# Patient Record
Sex: MALE | Race: WHITE | Employment: FULL TIME | URBAN - METROPOLITAN AREA
[De-identification: names, ages, dates, MRNs, and addresses within clinical notes are randomized per-mention and may not be internally consistent; named-entity substitution may affect disease eponyms.]

---

## 2017-02-03 ENCOUNTER — OFFICE VISIT (OUTPATIENT)
Dept: NEUROLOGY | Age: 42
End: 2017-02-03

## 2017-02-03 VITALS
WEIGHT: 308 LBS | HEART RATE: 79 BPM | BODY MASS INDEX: 44.09 KG/M2 | HEIGHT: 70 IN | SYSTOLIC BLOOD PRESSURE: 186 MMHG | DIASTOLIC BLOOD PRESSURE: 98 MMHG

## 2017-02-03 DIAGNOSIS — G40.209 PARTIAL EPILEPSY WITH IMPAIRMENT OF CONSCIOUSNESS (HCC): Primary | ICD-10-CM

## 2017-02-03 PROCEDURE — 99213 OFFICE O/P EST LOW 20 MIN: CPT | Performed by: PSYCHIATRY & NEUROLOGY

## 2017-02-03 RX ORDER — LEVETIRACETAM 750 MG/1
750 TABLET ORAL 2 TIMES DAILY
Qty: 180 TABLET | Refills: 1 | Status: SHIPPED | OUTPATIENT
Start: 2017-02-03 | End: 2017-08-03 | Stop reason: SDUPTHER

## 2017-08-03 ENCOUNTER — OFFICE VISIT (OUTPATIENT)
Dept: NEUROLOGY | Age: 42
End: 2017-08-03

## 2017-08-03 VITALS
DIASTOLIC BLOOD PRESSURE: 96 MMHG | HEART RATE: 63 BPM | HEIGHT: 70 IN | SYSTOLIC BLOOD PRESSURE: 155 MMHG | WEIGHT: 308 LBS | BODY MASS INDEX: 44.09 KG/M2

## 2017-08-03 DIAGNOSIS — G40.209 PARTIAL EPILEPSY WITH IMPAIRMENT OF CONSCIOUSNESS (HCC): ICD-10-CM

## 2017-08-03 PROCEDURE — 99213 OFFICE O/P EST LOW 20 MIN: CPT | Performed by: PSYCHIATRY & NEUROLOGY

## 2017-08-03 RX ORDER — LEVETIRACETAM 750 MG/1
750 TABLET ORAL 2 TIMES DAILY
Qty: 180 TABLET | Refills: 1 | Status: SHIPPED | OUTPATIENT
Start: 2017-08-03 | End: 2018-02-05 | Stop reason: SDUPTHER

## 2018-02-05 ENCOUNTER — OFFICE VISIT (OUTPATIENT)
Dept: NEUROLOGY | Age: 43
End: 2018-02-05

## 2018-02-05 VITALS
SYSTOLIC BLOOD PRESSURE: 152 MMHG | HEIGHT: 70 IN | BODY MASS INDEX: 45.1 KG/M2 | HEART RATE: 77 BPM | DIASTOLIC BLOOD PRESSURE: 88 MMHG | WEIGHT: 315 LBS

## 2018-02-05 DIAGNOSIS — G40.209 PARTIAL EPILEPSY WITH IMPAIRMENT OF CONSCIOUSNESS (HCC): ICD-10-CM

## 2018-02-05 PROCEDURE — 99213 OFFICE O/P EST LOW 20 MIN: CPT | Performed by: PSYCHIATRY & NEUROLOGY

## 2018-02-05 RX ORDER — LEVETIRACETAM 750 MG/1
750 TABLET ORAL 2 TIMES DAILY
Qty: 180 TABLET | Refills: 1 | Status: SHIPPED | OUTPATIENT
Start: 2018-02-05 | End: 2018-09-17 | Stop reason: SDUPTHER

## 2018-02-05 NOTE — PROGRESS NOTES
normal. No carotid bruit. No neck stiffness. Impression :  Partial complex seizures well controlled. Plan :  Continue Keppra 750 mg b.i.d. Please note a portion of  this chart was generated using dragon dictation software. Although every effort was made to ensure the accuracy of this automated transcription, some errors in transcription may have occurred.

## 2018-02-05 NOTE — PATIENT INSTRUCTIONS
Please call with any questions or concerns:   [facility]  @ [phone]. LAB RESULTS:  Please obtain any labs or diagnostic tests as discussed today. You should call the office to check the results. Please allow  3 to 7 days for us to get these results. MEDICATION LIST:  Please bring an accurate list of your medications to every visit. APPOINTMENT CONFIRMATION:  We will call you the day before your scheduled appointment to confirm. If we are unable to reach you, you MUST call back by the end of the day to confirm the appointment or we may be forced to cancel.

## 2018-09-17 ENCOUNTER — OFFICE VISIT (OUTPATIENT)
Dept: NEUROLOGY | Age: 43
End: 2018-09-17

## 2018-09-17 VITALS
WEIGHT: 315 LBS | HEART RATE: 76 BPM | BODY MASS INDEX: 45.1 KG/M2 | HEIGHT: 70 IN | DIASTOLIC BLOOD PRESSURE: 94 MMHG | SYSTOLIC BLOOD PRESSURE: 162 MMHG

## 2018-09-17 DIAGNOSIS — G40.209 PARTIAL EPILEPSY WITH IMPAIRMENT OF CONSCIOUSNESS (HCC): ICD-10-CM

## 2018-09-17 PROCEDURE — 99213 OFFICE O/P EST LOW 20 MIN: CPT | Performed by: PSYCHIATRY & NEUROLOGY

## 2018-09-17 RX ORDER — LEVETIRACETAM 750 MG/1
750 TABLET ORAL 2 TIMES DAILY
Qty: 180 TABLET | Refills: 1 | Status: SHIPPED | OUTPATIENT
Start: 2018-09-17 | End: 2019-02-22 | Stop reason: SDUPTHER

## 2019-02-22 ENCOUNTER — OFFICE VISIT (OUTPATIENT)
Dept: NEUROLOGY | Age: 44
End: 2019-02-22
Payer: COMMERCIAL

## 2019-02-22 VITALS
HEART RATE: 60 BPM | BODY MASS INDEX: 43.91 KG/M2 | DIASTOLIC BLOOD PRESSURE: 86 MMHG | SYSTOLIC BLOOD PRESSURE: 142 MMHG | WEIGHT: 306 LBS

## 2019-02-22 DIAGNOSIS — G40.209 PARTIAL EPILEPSY WITH IMPAIRMENT OF CONSCIOUSNESS (HCC): ICD-10-CM

## 2019-02-22 PROCEDURE — G8484 FLU IMMUNIZE NO ADMIN: HCPCS | Performed by: PSYCHIATRY & NEUROLOGY

## 2019-02-22 PROCEDURE — G8417 CALC BMI ABV UP PARAM F/U: HCPCS | Performed by: PSYCHIATRY & NEUROLOGY

## 2019-02-22 PROCEDURE — 99213 OFFICE O/P EST LOW 20 MIN: CPT | Performed by: PSYCHIATRY & NEUROLOGY

## 2019-02-22 PROCEDURE — G8427 DOCREV CUR MEDS BY ELIG CLIN: HCPCS | Performed by: PSYCHIATRY & NEUROLOGY

## 2019-02-22 PROCEDURE — 1036F TOBACCO NON-USER: CPT | Performed by: PSYCHIATRY & NEUROLOGY

## 2019-02-22 RX ORDER — LEVETIRACETAM 750 MG/1
750 TABLET ORAL 2 TIMES DAILY
Qty: 180 TABLET | Refills: 1 | Status: SHIPPED | OUTPATIENT
Start: 2019-02-22 | End: 2019-07-29 | Stop reason: SDUPTHER

## 2019-07-29 ENCOUNTER — OFFICE VISIT (OUTPATIENT)
Dept: NEUROLOGY | Age: 44
End: 2019-07-29
Payer: COMMERCIAL

## 2019-07-29 VITALS
DIASTOLIC BLOOD PRESSURE: 80 MMHG | HEIGHT: 70 IN | SYSTOLIC BLOOD PRESSURE: 146 MMHG | BODY MASS INDEX: 40.66 KG/M2 | WEIGHT: 284 LBS | HEART RATE: 74 BPM

## 2019-07-29 DIAGNOSIS — G40.209 PARTIAL EPILEPSY WITH IMPAIRMENT OF CONSCIOUSNESS (HCC): Primary | ICD-10-CM

## 2019-07-29 PROCEDURE — G8417 CALC BMI ABV UP PARAM F/U: HCPCS | Performed by: PSYCHIATRY & NEUROLOGY

## 2019-07-29 PROCEDURE — G8427 DOCREV CUR MEDS BY ELIG CLIN: HCPCS | Performed by: PSYCHIATRY & NEUROLOGY

## 2019-07-29 PROCEDURE — 1036F TOBACCO NON-USER: CPT | Performed by: PSYCHIATRY & NEUROLOGY

## 2019-07-29 PROCEDURE — 99213 OFFICE O/P EST LOW 20 MIN: CPT | Performed by: PSYCHIATRY & NEUROLOGY

## 2019-07-29 RX ORDER — LEVETIRACETAM 750 MG/1
750 TABLET ORAL 2 TIMES DAILY
Qty: 180 TABLET | Refills: 1 | Status: SHIPPED | OUTPATIENT
Start: 2019-07-29 | End: 2020-01-17 | Stop reason: SDUPTHER

## 2019-07-29 NOTE — PATIENT INSTRUCTIONS
Please call with any questions or concerns:   [facility]  @ [phone]. LAB RESULTS:  Please obtain any labs or diagnostic tests as discussed today. You should call the office to check the results. Please allow  3 to 7 days for us to get these results. MEDICATION LIST:  Please bring an accurate list of your medications to every visit. APPOINTMENT CONFIRMATION:  We will call you the day before your scheduled appointment to confirm. If we are unable to reach you, you MUST call back by the end of the day to confirm the appointment or we may be forced to cancel.

## 2020-01-17 ENCOUNTER — OFFICE VISIT (OUTPATIENT)
Dept: NEUROLOGY | Age: 45
End: 2020-01-17
Payer: COMMERCIAL

## 2020-01-17 VITALS
HEIGHT: 70 IN | SYSTOLIC BLOOD PRESSURE: 172 MMHG | BODY MASS INDEX: 40.97 KG/M2 | WEIGHT: 286.2 LBS | HEART RATE: 62 BPM | DIASTOLIC BLOOD PRESSURE: 90 MMHG

## 2020-01-17 PROCEDURE — 99213 OFFICE O/P EST LOW 20 MIN: CPT | Performed by: PSYCHIATRY & NEUROLOGY

## 2020-01-17 RX ORDER — LEVETIRACETAM 750 MG/1
750 TABLET ORAL 2 TIMES DAILY
Qty: 180 TABLET | Refills: 1 | Status: SHIPPED | OUTPATIENT
Start: 2020-01-17 | End: 2020-08-03 | Stop reason: SDUPTHER

## 2020-01-17 NOTE — PATIENT INSTRUCTIONS
Please call with any questions or concerns:   [facility]  @ [phone]. LAB RESULTS:  Please obtain any labs or diagnostic tests as discussed today. You should call the office to check the results. Please allow  3 to 7 days for us to get these results. MEDICATION LIST:  Please bring an accurate list of your medications to every visit. APPOINTMENT CONFIRMATION:  We will call you the day before your scheduled appointment to confirm. If we are unable to reach you, you MUST call back by the end of the day to confirm the appointment or we may be forced to cancel.

## 2020-01-17 NOTE — PROGRESS NOTES
Active member of club or organization: None     Attends meetings of clubs or organizations: None     Relationship status: None    Intimate partner violence:     Fear of current or ex partner: None     Emotionally abused: None     Physically abused: None     Forced sexual activity: None   Other Topics Concern    None   Social History Narrative    None        Objective:  Exam:  BP (!) 172/90   Pulse 62   Ht 5' 10\" (1.778 m)   Wt 286 lb 3.2 oz (129.8 kg)   BMI 41.07 kg/m²   This is an obese patient in no acute distress  Patient is awake, alert and oriented x3. Speech is normal.  Pupils are equal round reacting to light. Extraocular movements intact. Face symmetrical. Tongue midline. Motor exam shows normal symmetrical strength. Deep tendon reflexes normal. Plantar reflexes downgoing. Sensory exam normal. Coordination normal. Gait normal. No carotid bruit. No neck stiffness. Impression :  Partial complex seizures stable    Plan :  Continue Keppra 750 mg twice daily  Recommended that he continue to exercise and continue to lose weight. Please note a portion of  this chart was generated using dragon dictation software. Although every effort was made to ensure the accuracy of this automated transcription, some errors in transcription may have occurred.

## 2020-06-23 ENCOUNTER — OFFICE VISIT (OUTPATIENT)
Dept: DERMATOLOGY | Age: 45
End: 2020-06-23
Payer: COMMERCIAL

## 2020-06-23 VITALS — TEMPERATURE: 96.4 F

## 2020-06-23 PROCEDURE — 11102 TANGNTL BX SKIN SINGLE LES: CPT | Performed by: DERMATOLOGY

## 2020-06-23 PROCEDURE — 99202 OFFICE O/P NEW SF 15 MIN: CPT | Performed by: DERMATOLOGY

## 2020-06-25 LAB — DERMATOLOGY PATHOLOGY REPORT: ABNORMAL

## 2020-07-09 ENCOUNTER — PROCEDURE VISIT (OUTPATIENT)
Dept: SURGERY | Age: 45
End: 2020-07-09
Payer: COMMERCIAL

## 2020-07-09 VITALS — TEMPERATURE: 97.1 F

## 2020-07-09 PROCEDURE — 17311 MOHS 1 STAGE H/N/HF/G: CPT | Performed by: DERMATOLOGY

## 2020-07-09 PROCEDURE — 12052 INTMD RPR FACE/MM 2.6-5.0 CM: CPT | Performed by: DERMATOLOGY

## 2020-07-09 NOTE — PROGRESS NOTES
PRE-PROCEDURE SCREENING    Pacemaker/ICD: No  Difficulty with numbing in the past: No  Local Anesthesia Reaction/passing out: No  Latex or adhesive allergy:  No  Bleeding/Clotting Disorders: No  Anticoagulant Therapy: No  Joint prosthesis: No  Artificial Heart Valve: No  Stroke or Seizures: Yes, seizure (not had one in over 25 years)  Organ Transplant or Lymphoma: No  Immunosuppression: No  Respiratory Problems: No

## 2020-07-09 NOTE — PATIENT INSTRUCTIONS
[facility] Office Hours:    Monday-Thursday  7:30 AM-4:30 PM    Friday  9:00 AM-1:00 PM    POST-OPERATIVE CARE FOR DISSOLVING STICHES  Bandage change after 48 hours    During your procedure today, dissolving sutures, or stiches, were used to close the wound. You do not have to have stiches removed. They will dissolve (melt away) on their own. Please follow these instructions to help you recover from your procedure and help your wound heal.    CARING FOR YOUR SURGICAL SITE  The bandage should remain on and completely dry for 48 hours. Do NOT get the bandage wet. 1. After the first 48 hours, gently remove the remaining part of the bandage. It can be helpful to moisten the bandage edges in the shower. Steri strips may still be on the wound. It is ok, they will fall off slowly with the daily bandage changes. 2. Gently clean AROUND the wound daily with mild soap and water. Please avoid the area from getting wet. The more moisture is on the sutures the quicker they will dissolve. 3. Dry (pat) the area with a clean Q-tip or gauze. Try to clean off any debris or crust from the area. 4. Apply a layer of Vaseline/ Aquaphor (or Bacitracin if your doctor recommends) to the wound area only. 5. Cut a piece of Telfa (or any non-stick dressing) to fit just over the wound and secure it with paper tape. If the wound is small you may use a Band- Aid. Keep area covered for a total of 1 week(s). If the dressing comes off or if you have questions, or concerns about the dressing, please call the office for instructions! POST OPERATIVE INSTRUCTIONS    1. Activity: Do not lift anything heavier than a gallon of milk for 1 week. Also, avoid strenuous activity such as running, power walking or contact sports. 2. Eating and drinking: Do not drink alcohol for 48 hours after your procedure. Alcohol increases the chances of bleeding.   3. Medicines   -If you have discomfort, take Acetaminophen (Tylenol or Extra Strength Tylenol). Follow the instructions and warning on the bottle. -If your doctor has prescribed you an Aspirin daily, please keep taking it. Do not take extra Aspirin or medicines containing Aspirin (such as Sidra-[place] and Excedrin) for 48 hours after your procedure. Bleeding: If bleeding occurs, DO NOT remove the bandage. Put firm pressure on the area with gauze for 20 minutes without peeking. If the bleeding continues, apply pressure for another 20 minutes. If the bleeding does not stop after you apply pressure, call us right away. If you can not call, go to the nearest emergency room or urgent care facility. What to expect:  You may have these symptoms. They are normal and should get better with time:  1. Swelling. Swelling usually increases for the first 48 hours after your procedure and then begins to improve. Some soreness and redness around your wound. If we worked close to your eyes (forehead, nose, temple, or upper cheeks) your eyes may become swollen and/ or black and blue. 2. Bruising, which could last 1 week or more. 3. Pink and bumpy appearance to the scar. This may happen a few weeks after your procedure. After 4 weeks, you may gently massage the area each day with facial moisturizer or petroleum jelly (Vaseline or Aquaphor). This will help to smooth the skin and improve the appearance of the scar. The color of your scar will fade over time, but it may be pink for several months after the procedure. The scar may take 6 months to 1 year to reach its final color and appearance. 4. \"Spitting\" suture. Occasionally, an inside suture (stitch) does not completely dissolve. When this happens, (generally 4-8 weeks after surgery), it causes a bump or \"pimple\" to form on the scar. This is easily removed and is not at all serious. It does not mean the skin cancer has returned. Contact us if it happens, but do not be alarmed. Vitamin E oil is NOT necessary.  A good moisturizer is just as a gallon of milk for 1 week. Also, avoid strenuous activity such as running, power walking or contact sports. 5. Eating and drinking: Do not drink alcohol for 48 hours after your procedure. Alcohol increases the chances of bleeding. 6. Medicines   -If you have discomfort, take Acetaminophen (Tylenol or Extra Strength Tylenol). Follow the instructions and warning on the bottle. -If your doctor has prescribed you an Aspirin daily, please keep taking it. Do not take extra Aspirin or medicines containing Aspirin (such as Sidra-Prue and Excedrin) for 48 hours after your procedure. Bleeding: If bleeding occurs, DO NOT remove the bandage. Put firm pressure on the area with gauze for 20 minutes without peeking. If the bleeding continues, apply pressure for another 20 minutes. If the bleeding does not stop after you apply pressure, call us right away. If you can not call, go to the nearest emergency room or urgent care facility. What to expect:  You may have these symptoms. They are normal and should get better with time:  5. Swelling. Swelling usually increases for the first 48 hours after your procedure and then begins to improve. Some soreness and redness around your wound. If we worked close to your eyes (forehead, nose, temple, or upper cheeks) your eyes may become swollen and/ or black and blue. 6. Bruising, which could last 1 week or more. 7. Pink and bumpy appearance to the scar. This may happen a few weeks after your procedure. After 4 weeks, you may gently massage the area each day with facial moisturizer or petroleum jelly (Vaseline or Aquaphor). This will help to smooth the skin and improve the appearance of the scar. The color of your scar will fade over time, but it may be pink for several months after the procedure. The scar may take 6 months to 1 year to reach its final color and appearance. 8. \"Spitting\" suture. Occasionally, an inside suture (stitch) does not completely dissolve.  When this happens, (generally 4-8 weeks after surgery), it causes a bump or \"pimple\" to form on the scar. This is easily removed and is not at all serious. It does not mean the skin cancer has returned. Contact us if it happens, but do not be alarmed. Vitamin E oil is NOT necessary. A good moisturizer is just as effective. Sunscreen IS necessary. Use at least and SPF 30 sunscreen daily- even in winter    Call us at [phone] right away if you have any of the following symptoms:  -Bleeding that you can not stop (see highlighted area above). -Pain that lasts longer than 48 hours.  -Your wound becomes more painful, red or hot. -Bruising and swelling that does not begin to improve within the 48 hours or gets worse suddenly.

## 2020-07-09 NOTE — PROGRESS NOTES
The patient was counseled at length about the risks of danelle Covid-19 during their perioperative period and any recovery window from their procedure. The patient was made aware that danelle Covid-19  may worsen their prognosis for recovering from their procedure  and lend to a higher morbidity and/or mortality risk. All material risks, benefits, and reasonable alternatives including postponing the procedure were discussed. The patient does wish to proceed with the procedure at this time. MOHS PROCEDURE NOTE    PHYSICIAN:  Quirino Kasper. Helen Jimenez MD    ASSISTANT: Desmond Barrera RN     REFERRING PROVIDER:   Rodríguez Barry MD    PREOPERATIVE DIAGNOSIS: Nodular Basal Cell Carcinoma     SPECIFIC MOHS INDICATIONS:  location    AUC SCORIN/9    POSTOPERATIVE DIAGNOSIS: SAME    LOCATION: Left cheek    OPERATIVE PROCEDURE:  MOHS MICROGRAPHIC SURGERY    RECONSTRUCTION OF DEFECT: Intermediate layered closure    PREOPERATIVE SIZE: 8x7 MM    DEFECT SIZE: 14x11 MM    LENGTH OF REPAIRED WOUND/SIZE OF FLAP/SIZE OF GRAFT:  35 MM    ANESTHESIA: 6 mL 1% lidocaine with epinephrine 1:100,000 buffered. EBL:  MINIMAL    DURATION OF PROCEDURE:  1.25 HOURS    POSTOPERATIVE OBSERVATION: 0.75 HOUR    SPECIMENS:  SEE MOHS MAP    COMPLICATIONS:  NONE    DESCRIPTION OF PROCEDURE:  The patient was given a mirror, as appropriate, and the biopsy site was identified, marked with a surgical marking pen, and verified by the patient. Options for treatment were discussed and the patient was informed that Mohs surgery was the selected treatment based on its lower recurrence rate, given the features listed above, as compared to other treatment modalities such as excision, radiation, or curettage, and agreed with this treatment plan.   Risks and benefits including bruising, swelling, bleeding, infection, nerve injury, recurrence, and scarring were discussed with the patient prior to the procedure and a written consent detailing these and other risks was reviewed with the patient and signed. There was a time out for person and procedure verification. The surgical site was prepped with an antiseptic solution. Application of an antiseptic solution was repeated before each surgical stage. Stage I:  The clinically-apparent tumor was carefully defined and debulked, determining the edge of the surgical excision. A thin layer of tumor-laden tissue was excised with a narrow margin of normal-appearing skin, using the technique of Mohs. A map was prepared to correspond to the area of skin from which it was excised. Hemostasis was achieved using electrosurgery. The wound was bandaged. The tissue was prepared for the cryostat and sectioned. 1 section(s) prepared. Each section was coded, cut, and stained for microscopic examination. The entire base and margins of the excised piece of tissue were examined by the surgeon. No tumor was identified at the peripheral margins of stage I of microscopically controlled surgery. DEFECT MANAGEMENT:    REPAIR DESCRIPTION:  Various closure modalities were discussed with the patient, and it was decided that an intermediate layered repair would best preserve normal anatomic and functional relationships. Additional risk of wound dehiscence was discussed. The area was anesthetized with 1% lidocaine with epinephrine 1:100,000 buffered, was given a sterile prep using Chlorhexidine gluconate 4% solution and draped in the usual sterile fashion. Recreation and enlargement of the wound was performed by excising cones of tissue via the triangulation technique. The final incision lines were placed with respect for the patient's natural skin tension lines in a linear configuration to avoid functional and aesthetic distortion of adjacent free margins. Following minimal undermining, meticulous hemostasis was obtained with spot monopolar electrocoagulation.   Subcutaneous dead space and dermis were closed using 5-0 Vicryl buried subcutaneous interrupted suture and the epidermis was approximated with 5-0 Fast absorbing gut running epidermal sutures. WOUND COVERAGE:  The wound was cleaned with normal saline solution, dried off, Aquaphor ointment was applied, and the wound was covered. A dressing was applied for stabilization and light pressure. The patient was given detailed oral and written instructions on postoperative care. There were no complications. The patient left the Unit in good medical condition. FOLLOW-UP:  As dissolving sutures were placed, the patient was asked to return if any questions or concerns arose, but otherwise will return to see general dermatology per their instructions.

## 2020-07-10 ENCOUNTER — TELEPHONE (OUTPATIENT)
Dept: SURGERY | Age: 45
End: 2020-07-10

## 2020-07-10 NOTE — TELEPHONE ENCOUNTER
The patient was in the office on 07/09/20 for Mohs located on the left cheek with ILC repair. The patient tolerated the procedure well and left the office in good condition. A post-operative telephone call was placed at [phone] in order to check on the patient's recovery process. The patient was not able to be reached and a phone message was left.

## 2020-08-03 ENCOUNTER — OFFICE VISIT (OUTPATIENT)
Dept: NEUROLOGY | Age: 45
End: 2020-08-03
Payer: COMMERCIAL

## 2020-08-03 VITALS
BODY MASS INDEX: 41.52 KG/M2 | HEART RATE: 77 BPM | HEIGHT: 70 IN | WEIGHT: 290 LBS | DIASTOLIC BLOOD PRESSURE: 96 MMHG | SYSTOLIC BLOOD PRESSURE: 185 MMHG

## 2020-08-03 PROCEDURE — 99213 OFFICE O/P EST LOW 20 MIN: CPT | Performed by: PSYCHIATRY & NEUROLOGY

## 2020-08-03 RX ORDER — LEVETIRACETAM 750 MG/1
750 TABLET ORAL 2 TIMES DAILY
Qty: 180 TABLET | Refills: 1 | Status: SHIPPED | OUTPATIENT
Start: 2020-08-03 | End: 2021-02-19 | Stop reason: SDUPTHER

## 2020-08-03 NOTE — PROGRESS NOTES
[facility]   Neurology followup    Subjective:   CC/HP  History was obtained from patient  Patient has known partial complex seizures and these are well controlled. Patient's last seizure was in 1992. Patient is on Keppra without any major side effects.   No new neurological symptoms        REVIEW OF SYSTEMS    Constitutional:  []   Chills   []  Fatigue   []  Fevers   []  Malaise   []  Weight loss     [x] Denies all of the above    Respiratory:   []  Cough    []  Shortness of breath         [x] Denies all of the above     Cardiovascular:   []  Chest pain    []  Exertional chest pressure/discomfort           [] Palpitations    []  Syncope     [x] Denies all of the above        Past Medical History:   Diagnosis Date    Basal cell carcinoma     Localization-related (focal) (partial) epilepsy and epileptic syndromes with complex partial seizures, without mention of intractable epilepsy 1988    last seizure was in 13     Family History   Problem Relation Age of Onset    Cancer Mother     High Blood Pressure Mother     Diabetes Father     High Blood Pressure Father     Cancer Father         melanoma     Social History     Socioeconomic History    Marital status:      Spouse name: None    Number of children: None    Years of education: None    Highest education level: None   Occupational History    None   Social Needs    Financial resource strain: None    Food insecurity     Worry: None     Inability: None    Transportation needs     Medical: None     Non-medical: None   Tobacco Use    Smoking status: Never Smoker    Smokeless tobacco: Never Used   Substance and Sexual Activity    Alcohol use: No    Drug use: No    Sexual activity: None   Lifestyle    Physical activity     Days per week: None     Minutes per session: None    Stress: None   Relationships    Social connections     Talks on phone: None     Gets together: None     Attends Judaism service: None     Active member of club

## 2020-12-07 ENCOUNTER — OFFICE VISIT (OUTPATIENT)
Dept: DERMATOLOGY | Age: 45
End: 2020-12-07
Payer: COMMERCIAL

## 2020-12-07 VITALS — TEMPERATURE: 98.1 F

## 2020-12-07 PROCEDURE — 99213 OFFICE O/P EST LOW 20 MIN: CPT | Performed by: DERMATOLOGY

## 2020-12-07 RX ORDER — TRIAMCINOLONE ACETONIDE 1 MG/G
CREAM TOPICAL
Qty: 45 G | Refills: 2 | Status: SHIPPED | OUTPATIENT
Start: 2020-12-07 | End: 2022-06-27 | Stop reason: SDUPTHER

## 2020-12-07 NOTE — PATIENT INSTRUCTIONS
DRY SKIN CARE    1. Do not take more than 1 shower or bath each day. Try to spend 10 minutes or less in the shower/bath. Avoid hot showers as this can damage the skin and make the dryness worse. 2. Use a moisturizing or mild soap such Dove (for sensitive skin), Cetaphil (Cleansing Bar,Gentle Body Wash, Restoraderm Soothing Wash), CeraVe Hydrating Body Wash, Eucerin Skin Calming Body Wash, or Aveeno Daily Moisturizing Body Wash. Antibacterial soaps can be too drying. 3. Use soap only on limited areas (face, underarms, groin and buttocks). Try to avoid using soap on the arms, legs, trunk and back. 4. After showering, pat dry with a towel and generously apply a thick moisturizer all over. Use a moisturizer every day even if you are not showering that particular day. 5. If you are able, apply the moisturizer a second time during the day as well. 6. If a prescription cream or ointment has been ordered for you, apply the prescription medication to affected areas after your bath/shower while the skin is still damp, then apply the moisturizer to the remainder of the skin. 7. When it comes to moisturizers, the thicker the better. Ointments (such as vaseline) are thicker than creams, and creams are thicker than lotions. Suggested over-the-counter moisturizers include:    · CeraVe Lotion or Cream  · Cetaphil Lotion or Cream  · Eucerin Advanced Repair Cream, Advanced Repair Lotion, Eczema Relief Cream or Intensive Repair Lotion.    · Lubriderm Lotion  · Vaseline (petroleum jelly)  · Aquaphor  · Julianne moisturizing lotion or cream  · Neutrogena Hand Cream  · Aveeno Moisturizing Cream or Lotion  · Curel Ultra Healing   · Vanicream Moisturizing Skin Cream  ·

## 2020-12-07 NOTE — PROGRESS NOTES
[facility]  Roseanne Moreira MD  [phone]      Bina Clemente  1975    41 y.o. male     Date of Visit: 12/7/2020    Chief Complaint: follow up for [facility]    History of Present Illness:    1. He complains of a recent onset pruritic eruption on the left leg. 2.  He complains of a persistent asymptomatic lesion on the left lower forehead. 3.  He has a history of a nodular basal cell carcinoma on the left cheek-treated with Mohs by Dr. Jose Bo on 7/9/2020. Denies any signs of recurrence. Has fair skin. Has had multiple sunburns on the scalp. Dad w/ hx of melanoma on scalp.       Review of Systems:  Skin: No new or changing moles. Past Medical History, Family History, Surgical History, Medications and Allergies reviewed. Past Medical History:   Diagnosis Date    Basal cell carcinoma     Localization-related (focal) (partial) epilepsy and epileptic syndromes with complex partial seizures, without mention of intractable epilepsy 1988    last seizure was in 1992     Past Surgical History:   Procedure Laterality Date    MOHS SURGERY  07/09/2020       No Known Allergies  Outpatient Medications Marked as Taking for the 12/7/20 encounter (Office Visit) with Jhonathan Garcia MD   Medication Sig Dispense Refill    triamcinolone (KENALOG) 0.1 % cream Apply to affected areas of the skin twice daily for up to 2 weeks or until improved. 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        Physical Examination       The following were examined and determined to be normal: Psych/Neuro, Scalp/hair, Head/face, Conjunctivae/eyelids, Gums/teeth/lips, Neck, Breast/axilla/chest, Abdomen, Back, RUE, LUE, RLE and Nails/digits. The following were examined and determined to be abnormal: LLE. Well appearing.     1.  Left lower shin - scaly and crusted erythematous patch. 2.  Left lower forehead - stuck on appearing verrucous light brown plaque. 3.  Left superolateral cheek - linear surgical scar. Assessment and Plan     1. Irritant dermatitis -     Triamcinolone 0.1% cream twice daily for up to 2 weeks or until improved. 2. SK (seborrheic keratosis)     Reassurance. 3. History of basal cell carcinoma (BCC) - clear    Sun protective behaviors and self skin examinations were encouraged. Call for any new or concerning lesions. Return in about 6 months (around 6/7/2021).     --Andressa Carodna MD

## 2021-02-19 ENCOUNTER — OFFICE VISIT (OUTPATIENT)
Dept: NEUROLOGY | Age: 46
End: 2021-02-19
Payer: COMMERCIAL

## 2021-02-19 VITALS
SYSTOLIC BLOOD PRESSURE: 185 MMHG | BODY MASS INDEX: 43.81 KG/M2 | TEMPERATURE: 98 F | DIASTOLIC BLOOD PRESSURE: 102 MMHG | WEIGHT: 306 LBS | HEIGHT: 70 IN | HEART RATE: 80 BPM

## 2021-02-19 DIAGNOSIS — G40.209 PARTIAL EPILEPSY WITH IMPAIRMENT OF CONSCIOUSNESS (HCC): Primary | ICD-10-CM

## 2021-02-19 PROCEDURE — 99213 OFFICE O/P EST LOW 20 MIN: CPT | Performed by: PSYCHIATRY & NEUROLOGY

## 2021-02-19 RX ORDER — LEVETIRACETAM 750 MG/1
750 TABLET ORAL 2 TIMES DAILY
Qty: 180 TABLET | Refills: 1 | Status: SHIPPED | OUTPATIENT
Start: 2021-02-19 | End: 2021-08-02 | Stop reason: SDUPTHER

## 2021-02-19 NOTE — PROGRESS NOTES
[facility]   Neurology followup    Subjective:   CC/HP  History was obtained from patient  Patient has known partial complex seizures and these are well controlled. Patient's last seizure was in 1992. Patient is on Keppra without any major side effects.   No new neurological symptoms        REVIEW OF SYSTEMS    Constitutional:  []   Chills   []  Fatigue   []  Fevers   []  Malaise   []  Weight loss     [x] Denies all of the above    Respiratory:   []  Cough    []  Shortness of breath         [x] Denies all of the above     Cardiovascular:   []  Chest pain    []  Exertional chest pressure/discomfort           [] Palpitations    []  Syncope     [x] Denies all of the above        Past Medical History:   Diagnosis Date    Basal cell carcinoma     Localization-related (focal) (partial) epilepsy and epileptic syndromes with complex partial seizures, without mention of intractable epilepsy 1988    last seizure was in 13     Family History   Problem Relation Age of Onset    Cancer Mother     High Blood Pressure Mother     Diabetes Father     High Blood Pressure Father     Cancer Father         melanoma     Social History     Socioeconomic History    Marital status:      Spouse name: Not on file    Number of children: Not on file    Years of education: Not on file    Highest education level: Not on file   Occupational History    Not on file   Social Needs    Financial resource strain: Not on file    Food insecurity     Worry: Not on file     Inability: Not on file    Transportation needs     Medical: Not on file     Non-medical: Not on file   Tobacco Use    Smoking status: Never Smoker    Smokeless tobacco: Never Used   Substance and Sexual Activity    Alcohol use: No    Drug use: No    Sexual activity: Not on file   Lifestyle    Physical activity     Days per week: Not on file     Minutes per session: Not on file    Stress: Not on file   Relationships    Social connections     Talks on phone: Not on file     Gets together: Not on file     Attends Sabianist service: Not on file     Active member of club or organization: Not on file     Attends meetings of clubs or organizations: Not on file     Relationship status: Not on file    Intimate partner violence     Fear of current or ex partner: Not on file     Emotionally abused: Not on file     Physically abused: Not on file     Forced sexual activity: Not on file   Other Topics Concern    Not on file   Social History Narrative    Not on file        Objective:  Exam:  Ht 5' 10\" (1.778 m)   Wt 290 lb (131.5 kg)   BMI 41.61 kg/m²   This is an obese patient in no acute distress  Patient is awake, alert and oriented x3. Speech is normal.  Pupils are equal round reacting to light. Extraocular movements intact. Face symmetrical. Tongue midline. Motor exam shows normal symmetrical strength. Deep tendon reflexes normal. Plantar reflexes downgoing. Sensory exam normal. Coordination normal. Gait normal. No carotid bruit. No neck stiffness. Impression :  Partial complex seizures doing well  Plan :  Continue Keppra 750 mg twice daily  Recommended that he continue to exercise and continue to lose weight. Please note a portion of  this chart was generated using dragon dictation software. Although every effort was made to ensure the accuracy of this automated transcription, some errors in transcription may have occurred.

## 2021-06-21 ENCOUNTER — OFFICE VISIT (OUTPATIENT)
Dept: DERMATOLOGY | Age: 46
End: 2021-06-21
Payer: COMMERCIAL

## 2021-06-21 VITALS — TEMPERATURE: 98.1 F

## 2021-06-21 DIAGNOSIS — L81.4 SOLAR LENTIGO: ICD-10-CM

## 2021-06-21 DIAGNOSIS — D22.9 MULTIPLE NEVI: ICD-10-CM

## 2021-06-21 DIAGNOSIS — Z85.828 HISTORY OF BASAL CELL CARCINOMA (BCC): ICD-10-CM

## 2021-06-21 DIAGNOSIS — L57.0 ACTINIC KERATOSIS: Primary | ICD-10-CM

## 2021-06-21 PROCEDURE — 17000 DESTRUCT PREMALG LESION: CPT | Performed by: DERMATOLOGY

## 2021-06-21 PROCEDURE — 17003 DESTRUCT PREMALG LES 2-14: CPT | Performed by: DERMATOLOGY

## 2021-06-21 PROCEDURE — 99213 OFFICE O/P EST LOW 20 MIN: CPT | Performed by: DERMATOLOGY

## 2021-06-21 NOTE — PROGRESS NOTES
[facility] Dermatology  Howard Lorenzo MD  [phone]      Laqiuta English  1975    40 y.o. male     Date of Visit: 6/21/2021    Chief Complaint: skin lesions    History of Present Illness:    1. He has several persistent scaly lesions on the scalp. 2.  He has multiple unchanging pigmented lesions on the scalp, face, shoulders and upper extremities. 3.  Has multiple moles on the trunknot aware of any concerning changes. 4.  He has a history of a nodular basal cell carcinoma on the left cheektreated with Mohs by Dr. uSnni Dewey on 7/9/2020. He denies any signs of recurrence. Father with history of melanoma on the scalp. Review of Systems:  Gen: Feels well, good sense of health. Past Medical History, Family History, Surgical History, Medications and Allergies reviewed. Past Medical History:   Diagnosis Date    Basal cell carcinoma     Localization-related (focal) (partial) epilepsy and epileptic syndromes with complex partial seizures, without mention of intractable epilepsy 1988    last seizure was in 1992     Past Surgical History:   Procedure Laterality Date    MOHS SURGERY  07/09/2020       No Known Allergies  Outpatient Medications Marked as Taking for the 6/21/21 encounter (Office Visit) with Ry Ferreira MD   Medication Sig Dispense Refill    levETIRAcetam (KEPPRA) 750 MG tablet Take 1 tablet by mouth 2 times daily 180 tablet 1    triamcinolone (KENALOG) 0.1 % cream Apply to affected areas of the skin twice daily for up to 2 weeks or until improved. 45 g 2       Social History:  Occupation:  Has a [employer] ([employer]). 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       Physical Examination       The following were examined and determined to be normal: Psych/Neuro, Conjunctivae/eyelids, Gums/teeth/lips, Neck, Breast/axilla/chest, Abdomen, Back, RUE, LUE, RLE, LLE and Nails/digits.     The following were examined and determined to be abnormal: Scalp/hair and Head/face. Well appearing. 1.  Vertex scalp - 7, right forehead - 1, right cheek - 2: ill defined keratotic pink macules. 2.  Scalp, shoulders, and upper extremities with round smooth light brown macules and patches. 3.  Trunk with multiple round smooth brown macules. 4.  Clear. Assessment and Plan     1. Actinic keratoses - multiple    2 cycles of liquid nitrogen applied to 10 AKs: Vertex scalp - 7, right forehead - 1, right cheek - 2. Patient was educated regarding the potential risks of blister formation and discomfort. Wound care was discussed. 2. Solar lentigines    Monitor for change. Continue sun protective behaviors including use of a hat and at least SPF 30 sunscreen. 3. Multiple nevi - benign appearing    Sun protective behaviors, including use of at least SPF 30 sunscreen, and self skin examinations were encouraged. Call for any new or concerning lesions. 4. History of basal cell carcinoma (BCC) - clear    Sun protective behaviors, including use of at least SPF 30 sunscreen, and self skin examinations were encouraged. Call for any new or concerning lesions. Return in about 6 months (around 12/21/2021).     --Sonja Strong MD

## 2021-08-02 ENCOUNTER — OFFICE VISIT (OUTPATIENT)
Dept: NEUROLOGY | Age: 46
End: 2021-08-02
Payer: COMMERCIAL

## 2021-08-02 VITALS
BODY MASS INDEX: 43.81 KG/M2 | SYSTOLIC BLOOD PRESSURE: 190 MMHG | DIASTOLIC BLOOD PRESSURE: 101 MMHG | WEIGHT: 306 LBS | HEART RATE: 73 BPM | HEIGHT: 70 IN

## 2021-08-02 DIAGNOSIS — G40.209 PARTIAL EPILEPSY WITH IMPAIRMENT OF CONSCIOUSNESS (HCC): ICD-10-CM

## 2021-08-02 PROCEDURE — 99213 OFFICE O/P EST LOW 20 MIN: CPT | Performed by: PSYCHIATRY & NEUROLOGY

## 2021-08-02 RX ORDER — LEVETIRACETAM 750 MG/1
750 TABLET ORAL 2 TIMES DAILY
Qty: 180 TABLET | Refills: 1 | Status: SHIPPED | OUTPATIENT
Start: 2021-08-02 | End: 2022-03-22 | Stop reason: SDUPTHER

## 2021-08-02 NOTE — PROGRESS NOTES
[facility]   Neurology followup    Subjective:   CC/HP  History was obtained from patient  Patient has known partial complex seizures and these are well controlled. Patient's last seizure was in 1992. Patient is on Keppra without any major side effects.   No new neurological symptoms        REVIEW OF SYSTEMS    Constitutional:  []   Chills   []  Fatigue   []  Fevers   []  Malaise   []  Weight loss     [x] Denies all of the above    Respiratory:   []  Cough    []  Shortness of breath         [x] Denies all of the above     Cardiovascular:   []  Chest pain    []  Exertional chest pressure/discomfort           [] Palpitations    []  Syncope     [x] Denies all of the above        Past Medical History:   Diagnosis Date    Basal cell carcinoma     Localization-related (focal) (partial) epilepsy and epileptic syndromes with complex partial seizures, without mention of intractable epilepsy 1988    last seizure was in 12     Family History   Problem Relation Age of Onset    Cancer Mother     High Blood Pressure Mother     Diabetes Father     High Blood Pressure Father     Cancer Father         melanoma     Social History     Socioeconomic History    Marital status:      Spouse name: Not on file    Number of children: Not on file    Years of education: Not on file    Highest education level: Not on file   Occupational History    Not on file   Tobacco Use    Smoking status: Never Smoker    Smokeless tobacco: Never Used   Vaping Use    Vaping Use: Never used   Substance and Sexual Activity    Alcohol use: No    Drug use: No    Sexual activity: Not on file   Other Topics Concern    Not on file   Social History Narrative    Not on file     Social Determinants of Health     Financial Resource Strain:     Difficulty of Paying Living Expenses:    Food Insecurity:     Worried About Running Out of Food in the Last Year:     Ran Out of Food in the Last Year:    Transportation Needs:     Lack of

## 2022-01-03 ENCOUNTER — OFFICE VISIT (OUTPATIENT)
Dept: DERMATOLOGY | Age: 47
End: 2022-01-03
Payer: COMMERCIAL

## 2022-01-03 VITALS — TEMPERATURE: 97.7 F

## 2022-01-03 DIAGNOSIS — L82.1 SK (SEBORRHEIC KERATOSIS): ICD-10-CM

## 2022-01-03 DIAGNOSIS — L57.0 AK (ACTINIC KERATOSIS): ICD-10-CM

## 2022-01-03 DIAGNOSIS — Z85.828 HISTORY OF BASAL CELL CARCINOMA (BCC): ICD-10-CM

## 2022-01-03 DIAGNOSIS — L81.4 LENTIGO: ICD-10-CM

## 2022-01-03 DIAGNOSIS — D22.9 MULTIPLE NEVI: Primary | ICD-10-CM

## 2022-01-03 PROCEDURE — 99213 OFFICE O/P EST LOW 20 MIN: CPT | Performed by: DERMATOLOGY

## 2022-01-03 RX ORDER — FLUOROURACIL 50 MG/G
CREAM TOPICAL
Qty: 40 G | Refills: 0 | Status: SHIPPED | OUTPATIENT
Start: 2022-01-03 | End: 2022-06-27 | Stop reason: SDUPTHER

## 2022-01-03 NOTE — PROGRESS NOTES
[facility] Dermatology  Emily Day MD  [phone]      Liu Blanco  1975    40 y.o. male     Date of Visit: 1/3/2022    Chief Complaint: skin lesions/mole    History of Present Illness:    1. He has multiple nevi - not aware of any changes in size, color or shape. 2.  He has several asymptomatic lesions on the back. 3.  He has a stable asymptomatic lesion on the right shoulder. 4.  He has a hx of AKs - complains of persistent lesions on the scalp. 4.  He has a history of a nodular basal cell carcinoma on the left cheektreated with Mohs by Dr. Yoan Collado on 7/9/2020. Father with history of melanoma. Review of Systems:  Gen: Feels well, good sense of health. Past Medical History, Family History, Surgical History, Medications and Allergies reviewed. Past Medical History:   Diagnosis Date    Basal cell carcinoma     Localization-related (focal) (partial) epilepsy and epileptic syndromes with complex partial seizures, without mention of intractable epilepsy 1988    last seizure was in 1992     Past Surgical History:   Procedure Laterality Date    MOHS SURGERY  07/09/2020       No Known Allergies  Outpatient Medications Marked as Taking for the 1/3/22 encounter (Office Visit) with Ann Wild MD   Medication Sig Dispense Refill    fluorouracil (EFUDEX) 5 % cream Apply twice daily to the top of the scalp for 14 days. 40 g 0    levETIRAcetam (KEPPRA) 750 MG tablet Take 1 tablet by mouth 2 times daily 180 tablet 1    triamcinolone (KENALOG) 0.1 % cream Apply to affected areas of the skin twice daily for up to 2 weeks or until improved. 45 g 2       Social History:  Occupation:  Has a [employer] ([employer]).   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 The following were examined and determined to be abnormal: Scalp/hair. Well appearing. 1.  Trunk and extremities with multiple well defined round to oval smooth brown macules and papules. 2.  Back with stuck on appearing verrucous tan brown papules. 3.  Right superior shoulder - oval shaped smooth brown patch. 4.  Vertex scalp with ill defined keratotic skin colored macules. 5.  Clear. Assessment and Plan     1. Multiple nevi - benign appearing    Sun protective behaviors, including use of at least SPF 30 sunscreen, and self skin examinations were encouraged. Call for any new or concerning lesions. 2. SK (seborrheic keratosis)     Reassurance. 3. Lentigo     Monitor for change. 4. AK (actinic keratosis) - several on the top of the scalp    Efudex cream twice daily to the vertex scalp for 14 days. We discussed the likely side effects of treatment including redness, crusting, itching and burning. 5. History of basal cell carcinoma (BCC) - clear    Sun protective behaviors, including use of at least SPF 30 sunscreen, and self skin examinations were encouraged. Call for any new or concerning lesions. Return in about 6 months (around 7/3/2022).     --Parker Tboias MD

## 2022-03-22 ENCOUNTER — TELEMEDICINE (OUTPATIENT)
Dept: NEUROLOGY | Age: 47
End: 2022-03-22
Payer: COMMERCIAL

## 2022-03-22 DIAGNOSIS — G40.209 PARTIAL EPILEPSY WITH IMPAIRMENT OF CONSCIOUSNESS (HCC): ICD-10-CM

## 2022-03-22 PROCEDURE — 99442 PR PHYS/QHP TELEPHONE EVALUATION 11-20 MIN: CPT | Performed by: PSYCHIATRY & NEUROLOGY

## 2022-03-22 RX ORDER — LEVETIRACETAM 750 MG/1
750 TABLET ORAL 2 TIMES DAILY
Qty: 180 TABLET | Refills: 1 | Status: SHIPPED | OUTPATIENT
Start: 2022-03-22 | End: 2022-08-09 | Stop reason: SDUPTHER

## 2022-03-22 NOTE — PROGRESS NOTES
TELEHEALTH EVALUATION -- Audio/telephone evaluation (During [facility]-79 public health emergency)    Due to COVID 19 outbreak, patient's office visit was converted to a virtual visit. Patient was contacted and agreed to proceed with a virtual visit via   Telephone Visit   The risks and benefits of converting to a virtual visit were discussed in light of the current infectious disease epidemic. Patient also understood that insurance coverage and co-pays are up to their individual insurance plans. [facility]   Neurology [facility], was evaluated through a synchronous (real-time) audio encounter. The patient (or guardian if applicable) is aware that this is a billable service, which includes applicable co-pays. This Virtual Visit was conducted with patient's (and/or legal guardian's) consent. The visit was conducted pursuant to the emergency declaration under the [address] [place] [address], [address] [place] [address] authority and the [employer] and [employer] Act. Patient identification was verified, and a caregiver was present when appropriate. Subjective:   CC/HP  History was obtained from the patient. 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 Patient's last seizure was in 1992. Patient is on Keppra without any major side effects.   No new neurological symptoms    REVIEW OF SYSTEMS    Constitutional:  []   Chills   []  Fatigue   []  Fevers   []  Malaise   []  Weight loss     [x] Denies all of the above    Respiratory:   []  Cough    []  Shortness of breath         [x] Denies all of the above     Cardiovascular:   []  Chest pain    []  Exertional chest pressure/discomfort           [] Palpitations    []  Syncope     [x] Denies all of the above        Past Medical History:   Diagnosis Date    Basal cell carcinoma     Localization-related (focal) (partial) epilepsy and epileptic syndromes with complex partial Pay for Housing in the Last Year: Not on file    Number of Places Lived in the Last Year: Not on file    Unstable Housing in the Last Year: Not on file        Objective:  Exam:  There were no vitals taken for this visit. Neurological examination was not performed since this was a telephone visit. Impression :  Partial complex seizures stable    Plan :    Continue Keppra 750 mg twice daily  Refill the prescriptions  Return in 6 months  Time spent with patient during this telephone visit was 11 to 20 minutes      Please note a portion of  this chart was generated using dragon dictation software. Although every effort was made to ensure the accuracy of this automated transcription, some errors in transcription may have occurred. Pursuant to the emergency declaration under the [facility], [facility] waiver authority and the [employer] Act, this Virtual  Visit was conducted, with patient's consent, to reduce the patient's risk of exposure to COVID-19 and provide continuity of care for an established patient.

## 2022-06-27 ENCOUNTER — OFFICE VISIT (OUTPATIENT)
Dept: DERMATOLOGY | Age: 47
End: 2022-06-27
Payer: COMMERCIAL

## 2022-06-27 DIAGNOSIS — D22.9 MULTIPLE NEVI: ICD-10-CM

## 2022-06-27 DIAGNOSIS — L57.0 ACTINIC KERATOSIS: Primary | ICD-10-CM

## 2022-06-27 DIAGNOSIS — L30.9 CHRONIC DERMATITIS: ICD-10-CM

## 2022-06-27 PROCEDURE — 99213 OFFICE O/P EST LOW 20 MIN: CPT | Performed by: DERMATOLOGY

## 2022-06-27 RX ORDER — TRIAMCINOLONE ACETONIDE 1 MG/G
CREAM TOPICAL
Qty: 80 G | Refills: 1 | Status: SHIPPED | OUTPATIENT
Start: 2022-06-27

## 2022-06-27 RX ORDER — FLUOROURACIL 50 MG/G
CREAM TOPICAL
Qty: 40 G | Refills: 0 | Status: SHIPPED | OUTPATIENT
Start: 2022-06-27

## 2022-06-27 NOTE — PROGRESS NOTES
[facility] Dermatology  Zee Ortiz MD  [phone]      Liset Dubose  1975    56 y.o. male     Date of Visit: 6/27/2022    Chief Complaint: skin lesions    History of Present Illness:    1. Follow up for hx of AKs on the vertex scalp - used Efudex cream twice daily for 14 days. Had minor irritation with Efudex. 2.  He has multiple nevi - not aware of any changes in size, color or shape. 3.  Hx of dermatitis - ankles and legs in the winter months, improves with triamcinolone 0.1% cream.  Also has a pruritic area on the right wrist.    He has a history of a nodular basal cell carcinoma on the left cheektreated with Mohs by Dr. Shruti Molina on 7/9/2020.     Father with history of melanoma. Review of Systems:  Gen: Feels well, good sense of health. Past Medical History, Family History, Surgical History, Medications and Allergies reviewed. Past Medical History:   Diagnosis Date    Basal cell carcinoma     Localization-related (focal) (partial) epilepsy and epileptic syndromes with complex partial seizures, without mention of intractable epilepsy 1988    last seizure was in 1992     Past Surgical History:   Procedure Laterality Date    MOHS SURGERY  07/09/2020       No Known Allergies  Outpatient Medications Marked as Taking for the 6/27/22 encounter (Office Visit) with Henny Canales MD   Medication Sig Dispense Refill    triamcinolone (KENALOG) 0.1 % cream Apply to affected areas of the skin twice daily for up to 2 weeks or until improved. 80 g 1    fluorouracil (EFUDEX) 5 % cream Apply twice daily to the top of the scalp for 14 days.  40 g 0    levETIRAcetam (KEPPRA) 750 MG tablet Take 1 tablet by mouth 2 times daily 180 tablet 1       Social History:  Occupation:  Has a [employer] ([employer]). 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 The following were examined and determined to be abnormal: Scalp/hair, Head/face and RUE. Well appearing. 1.  Vertex scalp, temples with scattered scaly pink macules. 2.  Trunk and extremities with multiple well defined round to oval smooth brown macules and papules. 3.  Legs clear. Right lateral distal forearm - lichenified scaly skin colored plaque. Assessment and Plan     1. Actinic keratoses - improved but not clear, still with remaining lesions on the vertex scalp and several on the temples    Efudex cream twice daily to vertex scalp and temples for 21 days. We discussed the likely side effects of treatment including redness, crusting, itching and burning. 2. Multiple nevi - benign appearing    Sun protective behaviors, including use of at least SPF 30 sunscreen, and self skin examinations were encouraged. Call for any new or concerning lesions. 3. Chronic lichenified dermatitis of the right wrist -     Triamcinolone 0.1% cream twice daily for up to 2 weeks or until improved. Return in about 6 months (around 12/27/2022).     --Clint Carreon MD

## 2022-08-09 ENCOUNTER — OFFICE VISIT (OUTPATIENT)
Dept: NEUROLOGY | Age: 47
End: 2022-08-09
Payer: COMMERCIAL

## 2022-08-09 VITALS
BODY MASS INDEX: 43.81 KG/M2 | SYSTOLIC BLOOD PRESSURE: 169 MMHG | DIASTOLIC BLOOD PRESSURE: 99 MMHG | HEART RATE: 65 BPM | WEIGHT: 306 LBS | HEIGHT: 70 IN

## 2022-08-09 DIAGNOSIS — G40.209 PARTIAL EPILEPSY WITH IMPAIRMENT OF CONSCIOUSNESS (HCC): ICD-10-CM

## 2022-08-09 PROCEDURE — 99213 OFFICE O/P EST LOW 20 MIN: CPT | Performed by: PSYCHIATRY & NEUROLOGY

## 2022-08-09 RX ORDER — LEVETIRACETAM 750 MG/1
750 TABLET ORAL 2 TIMES DAILY
Qty: 180 TABLET | Refills: 1 | Status: SHIPPED | OUTPATIENT
Start: 2022-08-09

## 2022-08-09 NOTE — PROGRESS NOTES
[facility]   Neurology followup    Subjective:   CC/HP  History was obtained from patient  Patient has known partial complex seizures and these are well controlled. Patient's last seizure was in 1992. Patient is on Keppra without any major side effects. No new neurological symptoms        REVIEW OF SYSTEMS    Constitutional:  []   Chills   []  Fatigue   []  Fevers   []  Malaise   []  Weight loss     [x] Denies all of the above    Respiratory:   []  Cough    []  Shortness of breath         [x] Denies all of the above     Cardiovascular:   []  Chest pain    []  Exertional chest pressure/discomfort           [] Palpitations    []  Syncope     [x] Denies all of the above        Past Medical History:   Diagnosis Date    Basal cell carcinoma     Localization-related (focal) (partial) epilepsy and epileptic syndromes with complex partial seizures, without mention of intractable epilepsy 1988    last seizure was in 1992     Family History   Problem Relation Age of Onset    Cancer Mother     High Blood Pressure Mother     Diabetes Father     High Blood Pressure Father     Cancer Father         melanoma     Social History     Socioeconomic History    Marital status:    Tobacco Use    Smoking status: Never    Smokeless tobacco: Never   Vaping Use    Vaping Use: Never used   Substance and Sexual Activity    Alcohol use: No    Drug use: No        Objective:  Exam:  BP (!) 177/100   Pulse 65   Ht 5' 10\" (1.778 m)   Wt (!) 306 lb (138.8 kg)   BMI 43.91 kg/m²   This is an obese patient in no acute distress  Patient is awake, alert and oriented x3. Speech is normal.  Pupils are equal round reacting to light. Extraocular movements intact. Face symmetrical. Tongue midline. Motor exam shows normal symmetrical strength. Deep tendon reflexes normal. Plantar reflexes downgoing. Sensory exam normal. Coordination normal. Gait normal. No carotid bruit. No neck stiffness.       Impression :  Partial complex seizures doing well    Plan :  Continue Keppra 750 mg twice daily  Recommended that he continue to exercise and continue to lose weight. Please note a portion of  this chart was generated using dragon dictation software. Although every effort was made to ensure the accuracy of this automated transcription, some errors in transcription may have occurred.

## 2023-01-03 ENCOUNTER — OFFICE VISIT (OUTPATIENT)
Dept: DERMATOLOGY | Age: 48
End: 2023-01-03
Payer: COMMERCIAL

## 2023-01-03 DIAGNOSIS — D48.5 NEOPLASM OF UNCERTAIN BEHAVIOR OF SKIN: ICD-10-CM

## 2023-01-03 DIAGNOSIS — L57.0 ACTINIC KERATOSIS: Primary | ICD-10-CM

## 2023-01-03 DIAGNOSIS — L30.9 CHRONIC DERMATITIS: ICD-10-CM

## 2023-01-03 PROCEDURE — 99213 OFFICE O/P EST LOW 20 MIN: CPT | Performed by: DERMATOLOGY

## 2023-01-03 RX ORDER — TRIAMCINOLONE ACETONIDE 1 MG/G
CREAM TOPICAL
Qty: 454 G | Refills: 2 | Status: SHIPPED | OUTPATIENT
Start: 2023-01-03

## 2023-01-03 NOTE — PROGRESS NOTES
[facility] Dermatology  Richard Soto MD  [phone]      Liang Avendaño  1975    56 y.o. male     Date of Visit: 1/3/2023    Chief Complaint: follow up for AKs    History of Present Illness:    1. He returns today for AKs. Used Efudex cream twice daily to the vertex scalp and temples for 21 days with improvement (had minimal irritation with 14 day tx course). He complains of a couple of residual scaly lesions on the vertex scalp and each temple. 2.  Follow-up for history of dermatitis on the trunk and lower extremities-mainly presents in the colder weather months only. Improves with use of triamcinolone 0.1% cream.      Derm Hx:    He has a history of a nodular basal cell carcinoma on the left cheek-treated with Mohs by Dr. Precious Steele on 7/9/2020. Father with history of melanoma. Review of Systems:  Gen: Feels well, good sense of health. Past Medical History, Family History, Surgical History, Medications and Allergies reviewed. Past Medical History:   Diagnosis Date    Basal cell carcinoma     Localization-related (focal) (partial) epilepsy and epileptic syndromes with complex partial seizures, without mention of intractable epilepsy 1988    last seizure was in 1992     Past Surgical History:   Procedure Laterality Date    MOHS SURGERY  07/09/2020       No Known Allergies  Outpatient Medications Marked as Taking for the 1/3/23 encounter (Office Visit) with Miles Russo MD   Medication Sig Dispense Refill    triamcinolone (KENALOG) 0.1 % cream Apply to affected areas of the skin twice daily for up to 2 weeks or until improved. 454 g 2    levETIRAcetam (KEPPRA) 750 MG tablet Take 1 tablet by mouth in the morning and 1 tablet before bedtime. 180 tablet 1       Social History:  Occupation:  Has a [employer] ([employer]).   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       Physical Examination       The following were examined and determined to be normal: Psych/Neuro, Conjunctivae/eyelids, Gums/teeth/lips, Neck, Breast/axilla/chest, Abdomen, Back, RUE, RLE, LLE, and Nails/digits. The following were examined and determined to be abnormal: Scalp/hair, Head/face, and LUE. Well-appearing. 1.  Frontal scalp-1, each temple-1: Few scaly pink macules. 2.  Lower abdomen with few ill-defined scaly erythematous patches. 3.  Left lateral shoulder with a 3 to 4 mm well-defined pink-brown macule. Assessment and Plan     1. Actinic keratosis -previously multiple of the vertex scalp and temples, markedly improved after a 21-day course of Efudex    Efudex cream twice daily for another 14 to 21 days to the residual lesions on the frontal scalp and temples. 2. Chronic dermatitis - mild today    Triamcinolone 0.1% cream twice daily for up to 2 weeks for recurrences. 3. Neoplasm of uncertain behavior of skin, left lateral shoulder-irritated nevus versus dysplastic nevus versus less likely melanoma    Discussed possible diagnosis; patient agreeable to proceed with biopsy (written consent obtained). Risks of the procedure were reviewed including discomfort, bleeding, scar and infection. The area(s) to be biopsied were marked with a surgical pen. Alcohol was used to cleanse the site. Local anesthesia was acheived with 1% lidocaine with epinephrine. Shave biopsy was performed using a razor blade. Hemostasis was achieved with aluminum chloride. The wound(s) were dressed with petrolatum and covered with a bandage. Wound care instructions were reviewed. 1 Specimen (s) sent to pathology. The specimen bottles were appropriately labeled. The patient tolerated the procedure well and there were no immediate complications. Return in about 6 months (around 7/3/2023).     --Jerilyn Palacios MD

## 2023-01-03 NOTE — PATIENT INSTRUCTIONS

## 2023-01-05 LAB — DERMATOLOGY PATHOLOGY REPORT: NORMAL

## 2023-02-03 ENCOUNTER — SCHEDULED TELEPHONE ENCOUNTER (OUTPATIENT)
Dept: NEUROLOGY | Age: 48
End: 2023-02-03
Payer: COMMERCIAL

## 2023-02-03 DIAGNOSIS — G40.209 PARTIAL EPILEPSY WITH IMPAIRMENT OF CONSCIOUSNESS (HCC): ICD-10-CM

## 2023-02-03 PROCEDURE — 99442 PR PHYS/QHP TELEPHONE EVALUATION 11-20 MIN: CPT | Performed by: PSYCHIATRY & NEUROLOGY

## 2023-02-03 RX ORDER — LEVETIRACETAM 750 MG/1
750 TABLET ORAL 2 TIMES DAILY
Qty: 180 TABLET | Refills: 1 | Status: CANCELLED | OUTPATIENT
Start: 2023-02-03

## 2023-02-03 NOTE — PROGRESS NOTES
TELEHEALTH EVALUATION -- Audio/telephone evaluation (During [facility] public health emergency)    Due to COVID 19 outbreak, patient's office visit was converted to a virtual visit. Patient was contacted and agreed to proceed with a virtual visit via   Telephone Visit   The risks and benefits of converting to a virtual visit were discussed in light of the current infectious disease epidemic. Patient also understood that insurance coverage and co-pays are up to their individual insurance plans. [facility]   Neurology [facility], was evaluated through a synchronous (real-time) audio encounter. The patient (or guardian if applicable) is aware that this is a billable service, which includes applicable co-pays. This Virtual Visit was conducted with patient's (and/or legal guardian's) consent. The visit was conducted pursuant to the emergency declaration under the [address] [place] [address], [address] [place] [address] authority and the [employer] General Act. Patient identification was verified, and a caregiver was present when appropriate. Subjective:   CC/HP  History was obtained from the patient. 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 Patient's last seizure was in 1992. No new neurological symptoms    Patient volunteered some information today over the phone. He admitted that he has not been taking the seizure medicine since 2010 or 2011. He states that he gets medication refills from me but does not take the medication at all since it made him feel somewhat lethargic. He has not had a seizure since 1992. He states that he was feeling guilty about it and wanted to tell me the truth today.     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 Neurological examination was not performed since this was a telephone visit. Impression :  Partial complex seizures   , Patient has not been taking medications for more than 12 years. This information was volunteered by him today. Plan :  Discussed at length with patient  Since patient has not had a seizure in more than 12 years without medication he does not need any medicine to be started at this time. We discussed about his previously abnormal EEG and he agreed to get the EEG repeated. If the EEG is now normal I will see him only on a as needed basis. Time spent with patient during this telephone visit was 15 minutes      Please note a portion of  this chart was generated using dragon dictation software. Although every effort was made to ensure the accuracy of this automated transcription, some errors in transcription may have occurred.      Pursuant to the emergency declaration under the [facility] waiver authority and the Coronavirus Preparedness and Response Supplemental Appropriations Act, this Virtual  Visit was conducted, with patient's consent, to reduce the patient's risk of exposure to COVID-19 and provide continuity of care for an established patient.

## 2023-03-13 ENCOUNTER — HOSPITAL ENCOUNTER (OUTPATIENT)
Dept: NEUROLOGY | Age: 48
Discharge: HOME | End: 2023-03-13
Payer: COMMERCIAL

## 2023-03-13 DIAGNOSIS — G40.209 PARTIAL EPILEPSY WITH IMPAIRMENT OF CONSCIOUSNESS (HCC): ICD-10-CM

## 2023-03-13 PROCEDURE — 95816 EEG AWAKE AND DROWSY: CPT

## 2023-03-13 NOTE — PROCEDURES
[address]                     [address] [place] [address], [facility] [address]                          ELECTROENCEPHALOGRAM REPORT    PATIENT NAME: Galen Finley                   :        1975  MED REC NO:   [id number]                          ROOM:  ACCOUNT NO:   [de-identified]                           ADMIT DATE: 2023  PROVIDER:     Emma Cooper MD    DATE OF EE2023    REASON FOR STUDY:  Epilepsy. SUMMARY:  The background rhythm on this recording consists of  well-developed and well-organized waveforms of 10-12 Hz frequency which  are bilaterally synchronous and symmetrical.  There were a few episodes  of generalized spike and slow-wave abnormality seen, but more prominent  in the left hemisphere. Hyperventilation and photic stimulation did not  produce any additional abnormalities. A sleep recording was not  obtained. IMPRESSION:  This EEG obtained during the awake state and drowsiness is  abnormal with episodes of brief generalized spike and slow-wave activity  consistent with epileptic disorder. Clinical correlation is  recommended.         Thomas Gerardo MD    D: 2023 16:39:02       T: 2023 16:41:13     [email or web address]  Job#: [id number]     Doc#: [id number]    CC:

## 2023-03-27 ENCOUNTER — OFFICE VISIT (OUTPATIENT)
Dept: NEUROLOGY | Age: 48
End: 2023-03-27
Payer: COMMERCIAL

## 2023-03-27 VITALS
DIASTOLIC BLOOD PRESSURE: 88 MMHG | BODY MASS INDEX: 43.81 KG/M2 | HEART RATE: 85 BPM | WEIGHT: 306 LBS | HEIGHT: 70 IN | SYSTOLIC BLOOD PRESSURE: 135 MMHG

## 2023-03-27 DIAGNOSIS — G40.209 PARTIAL EPILEPSY WITH IMPAIRMENT OF CONSCIOUSNESS (HCC): Primary | ICD-10-CM

## 2023-03-27 PROCEDURE — 99214 OFFICE O/P EST MOD 30 MIN: CPT | Performed by: PSYCHIATRY & NEUROLOGY

## 2023-03-27 RX ORDER — LEVETIRACETAM 500 MG/1
TABLET ORAL
Qty: 60 TABLET | Refills: 0 | Status: SHIPPED | OUTPATIENT
Start: 2023-03-27

## 2023-03-27 NOTE — PROGRESS NOTES
[facility]   Neurology followup    Subjective:   CC/HP  History was obtained from patient  Patient has known partial complex seizures and these are well controlled. Patient's last seizure was in 1992. Patient apparently had not been taking Keppra for several years but had been doing well without any clinical seizures. However when he told me about it we decided to get an EEG. Unfortunately the EEG showed spike and slow wave activity on the left hemisphere with some spread to the right side. He is back again here to discuss about medication. Patient states that the previous dose of Keppra at 750 mg twice daily made him feel somewhat funny strange and lightheaded. She would like to take a lower dose of medication or try a different medication. REVIEW OF SYSTEMS    Constitutional:  []   Chills   [x]  Fatigue   []  Fevers   []  Malaise   []  Weight loss     [] Denies all of the above    Respiratory:   []  Cough    []  Shortness of breath         [x] Denies all of the above     Cardiovascular:   []  Chest pain    []  Exertional chest pressure/discomfort           [] Palpitations    []  Syncope     [x] Denies all of the above        Past Medical History:   Diagnosis Date    Basal cell carcinoma     Localization-related (focal) (partial) epilepsy and epileptic syndromes with complex partial seizures, without mention of intractable epilepsy 1988    last seizure was in 13     Family History   Problem Relation Age of Onset    Cancer Mother     High Blood Pressure Mother     Diabetes Father     High Blood Pressure Father     Cancer Father         melanoma     Social History     Socioeconomic History    Marital status:      Spouse name: None    Number of children: None    Years of education: None    Highest education level: None   Tobacco Use    Smoking status: Never    Smokeless tobacco: Never   Vaping Use    Vaping Use: Never used   Substance and Sexual Activity    Alcohol use: No    Drug use:  No

## 2023-04-16 ENCOUNTER — PATIENT MESSAGE (OUTPATIENT)
Dept: DERMATOLOGY | Age: 48
End: 2023-04-16

## 2023-04-17 NOTE — TELEPHONE ENCOUNTER
From: Laron Ward  To: Dr. Blayne Blount  Sent: 4/16/2023 9:19 PM EDT  Subject: Colby Faustinlynne    Hi this is Femis wife, Colby Root. I am a patient with you and I need to setup my chart. Can you send me an invite? I also need a refill for my fever blister medicine at the [facility] in [place]. Please contact me at [phone].      Thank you,  Colby Root

## 2023-05-01 ENCOUNTER — OFFICE VISIT (OUTPATIENT)
Dept: NEUROLOGY | Age: 48
End: 2023-05-01
Payer: COMMERCIAL

## 2023-05-01 VITALS
SYSTOLIC BLOOD PRESSURE: 198 MMHG | WEIGHT: 306 LBS | HEIGHT: 70 IN | DIASTOLIC BLOOD PRESSURE: 102 MMHG | BODY MASS INDEX: 43.81 KG/M2 | HEART RATE: 75 BPM

## 2023-05-01 DIAGNOSIS — G40.209 PARTIAL EPILEPSY WITH IMPAIRMENT OF CONSCIOUSNESS (HCC): Primary | ICD-10-CM

## 2023-05-01 DIAGNOSIS — T88.7XXA MEDICATION SIDE EFFECT: ICD-10-CM

## 2023-05-01 DIAGNOSIS — R42 DIZZINESS: ICD-10-CM

## 2023-05-01 PROCEDURE — 99214 OFFICE O/P EST MOD 30 MIN: CPT | Performed by: PSYCHIATRY & NEUROLOGY

## 2023-05-01 RX ORDER — LEVETIRACETAM 500 MG/1
TABLET ORAL
Qty: 135 TABLET | Refills: 1 | Status: CANCELLED | OUTPATIENT
Start: 2023-05-01

## 2023-05-01 RX ORDER — LEVETIRACETAM 750 MG/1
750 TABLET, EXTENDED RELEASE ORAL DAILY
Qty: 30 TABLET | Refills: 0 | Status: SHIPPED | OUTPATIENT
Start: 2023-05-01

## 2023-05-01 NOTE — PROGRESS NOTES
[facility]   Neurology followup    Subjective:   CC/HP  History was obtained from patient  Patient has known partial complex seizures and these are well controlled. Patient's last seizure was in 1992. Patient apparently had not been taking Keppra for several years but had been doing well without any clinical seizures. However when he told me about it we decided to get an EEG. Unfortunately the EEG showed spike and slow wave activity on the left hemisphere with some spread to the right side. So we put him back on low-dose Keppra during the last office visit. He is now taking Keppra 250 mg in the morning and 500 mg at night and he still feels somewhat dizzy lightheaded and sleepy all the time.       REVIEW OF SYSTEMS    Constitutional:  []   Chills   [x]  Fatigue   []  Fevers   []  Malaise   []  Weight loss     [] Denies all of the above    Respiratory:   []  Cough    []  Shortness of breath         [x] Denies all of the above     Cardiovascular:   []  Chest pain    []  Exertional chest pressure/discomfort           [] Palpitations    []  Syncope     [x] Denies all of the above        Past Medical History:   Diagnosis Date    Basal cell carcinoma     Localization-related (focal) (partial) epilepsy and epileptic syndromes with complex partial seizures, without mention of intractable epilepsy 1988    last seizure was in 13     Family History   Problem Relation Age of Onset    Cancer Mother     High Blood Pressure Mother     Diabetes Father     High Blood Pressure Father     Cancer Father         melanoma     Social History     Socioeconomic History    Marital status:      Spouse name: None    Number of children: None    Years of education: None    Highest education level: None   Tobacco Use    Smoking status: Never    Smokeless tobacco: Never   Vaping Use    Vaping Use: Never used   Substance and Sexual Activity    Alcohol use: No    Drug use: No        Objective:  Exam:  BP (!) 198/102   Pulse 75

## 2023-05-30 ENCOUNTER — TELEMEDICINE (OUTPATIENT)
Dept: NEUROLOGY | Age: 48
End: 2023-05-30
Payer: COMMERCIAL

## 2023-05-30 DIAGNOSIS — G40.209 PARTIAL EPILEPSY WITH IMPAIRMENT OF CONSCIOUSNESS (HCC): Primary | ICD-10-CM

## 2023-05-30 PROCEDURE — 99213 OFFICE O/P EST LOW 20 MIN: CPT | Performed by: PSYCHIATRY & NEUROLOGY

## 2023-05-30 RX ORDER — LEVETIRACETAM 750 MG/1
750 TABLET, EXTENDED RELEASE ORAL DAILY
Qty: 30 TABLET | Refills: 0 | Status: SHIPPED | OUTPATIENT
Start: 2023-05-30

## 2023-05-30 NOTE — PROGRESS NOTES
patient  Continue Keppra  mg daily  Check level and 1 month and see me back at that time if the level is low we may have to adjust the dose        Please note a portion of  this chart was generated using dragon dictation software. Although every effort was made to ensure the accuracy of this automated transcription, some errors in transcription may have occurred. Ally Ohara, was evaluated through a synchronous (real-time) audio-video encounter. The patient (or guardian if applicable) is aware that this is a billable service, which includes applicable co-pays. This Virtual Visit was conducted with patient's (and/or legal guardian's) consent. Patient identification was verified, and a caregiver was present when appropriate. The patient was located at Home: [address] [place] [address] [address]  Provider was located at [facility] ([address] [place] [address]): [address] [place] [address],[address]  [place],  [address]         Total time spent for this encounter: Not billed by time    --Marcelino Ann MD on 5/30/2023 at 4:00 PM    An electronic signature was used to authenticate this note.

## 2023-06-26 ENCOUNTER — HOSPITAL ENCOUNTER (OUTPATIENT)
Age: 48
Discharge: HOME | End: 2023-06-26
Payer: COMMERCIAL

## 2023-06-26 DIAGNOSIS — G40.209 PARTIAL EPILEPSY WITH IMPAIRMENT OF CONSCIOUSNESS (HCC): ICD-10-CM

## 2023-06-26 LAB
LEVETIRACETAM SERPL-MCNC: 4.8 UG/ML (ref 6–46)
MEDICATION DOSE-MCNC: ABNORMAL

## 2023-06-26 PROCEDURE — 80177 DRUG SCRN QUAN LEVETIRACETAM: CPT

## 2023-06-28 ENCOUNTER — OFFICE VISIT (OUTPATIENT)
Dept: DERMATOLOGY | Age: 48
End: 2023-06-28
Payer: COMMERCIAL

## 2023-06-28 DIAGNOSIS — D48.5 NEOPLASM OF UNCERTAIN BEHAVIOR OF SKIN: ICD-10-CM

## 2023-06-28 DIAGNOSIS — L57.0 ACTINIC KERATOSIS: ICD-10-CM

## 2023-06-28 DIAGNOSIS — D22.9 MULTIPLE NEVI: Primary | ICD-10-CM

## 2023-06-28 PROCEDURE — 11102 TANGNTL BX SKIN SINGLE LES: CPT | Performed by: DERMATOLOGY

## 2023-06-28 PROCEDURE — 99213 OFFICE O/P EST LOW 20 MIN: CPT | Performed by: DERMATOLOGY

## 2023-06-29 ENCOUNTER — TELEMEDICINE (OUTPATIENT)
Dept: NEUROLOGY | Age: 48
End: 2023-06-29
Payer: COMMERCIAL

## 2023-06-29 DIAGNOSIS — G40.209 PARTIAL EPILEPSY WITH IMPAIRMENT OF CONSCIOUSNESS (HCC): ICD-10-CM

## 2023-06-29 PROCEDURE — 99213 OFFICE O/P EST LOW 20 MIN: CPT | Performed by: PSYCHIATRY & NEUROLOGY

## 2023-06-29 RX ORDER — LEVETIRACETAM 500 MG/1
TABLET, EXTENDED RELEASE ORAL
Qty: 180 TABLET | Refills: 0 | Status: SHIPPED | OUTPATIENT
Start: 2023-06-29

## 2023-07-03 LAB — DERMATOLOGY PATHOLOGY REPORT: ABNORMAL

## 2023-07-06 ENCOUNTER — TELEPHONE (OUTPATIENT)
Dept: DERMATOLOGY | Age: 48
End: 2023-07-06

## 2023-07-06 DIAGNOSIS — C44.41 BASAL CELL CARCINOMA (BCC) OF SCALP: Primary | ICD-10-CM

## 2023-07-06 NOTE — TELEPHONE ENCOUNTER
*See result note from 6/28/23. MD sent detailed Dynadm[employer] message informing patient of biopsy results. Patient was called on 7/3, 7/5, and today to see if he had any questions regarding results. Will place referral to Dr Tilden Essex for Mohs.

## 2023-08-01 ENCOUNTER — PROCEDURE VISIT (OUTPATIENT)
Dept: SURGERY | Age: 48
End: 2023-08-01
Payer: COMMERCIAL

## 2023-08-01 VITALS — DIASTOLIC BLOOD PRESSURE: 86 MMHG | SYSTOLIC BLOOD PRESSURE: 154 MMHG

## 2023-08-01 DIAGNOSIS — C44.41 BASAL CELL CARCINOMA OF SCALP: Primary | ICD-10-CM

## 2023-08-01 PROCEDURE — 17311 MOHS 1 STAGE H/N/HF/G: CPT | Performed by: DERMATOLOGY

## 2023-08-01 PROCEDURE — 12032 INTMD RPR S/A/T/EXT 2.6-7.5: CPT | Performed by: DERMATOLOGY

## 2023-08-01 NOTE — PATIENT INSTRUCTIONS
[facility] Office Hours:    Monday-Thursday  7:30 AM-4:30 PM    Friday  9:00 AM-1:00 PM      POST-OPERATIVE CARE FOR STITCHES  Bandage change after 48 hours    CARING FOR YOUR SURGICAL SITE  The bandage should remain on and completely dry for 48 hours. Do NOT get the bandage wet. After the first 48 hours, gently remove the remaining part of the bandage. It can be helpful to moisten the bandage edges in the shower. Steri strips may still be on the wound. It is ok, they will fall off slowly with the daily bandage changes. Gently clean the wound daily with mild soap and water. Try to clean off crust and debris. Dry (pat) the area with a clean Q-tip or gauze. Apply a layer of Vaseline/ Aquaphor (or Bacitracin if your doctor recommends) to the wound area only. Cut a piece of Telfa (or any non-stick dressing) to fit just over the wound and secure it with paper tape. If the wound is small you may use a Band- Aid. Keep area covered for a total of 2-3 week(s). If the dressing comes off or if you have questions, or concerns about the dressing, please call the office for instructions! POST OPERATIVE INSTRUCTIONS    Activity: Do not lift anything heavier than a gallon of milk for 1 week. Also, avoid strenuous activity such as running, power walking or contact sports. Eating and drinking: Do not drink alcohol for 48 hours after your procedure. Alcohol increases the chances of bleeding. Medicines   -If you have discomfort, take Acetaminophen (Tylenol or Extra Strength Tylenol). Follow the instructions and warning on the bottle. -If your doctor has prescribed you an Aspirin daily, please keep taking it. Do not take extra Aspirin or medicines containing Aspirin (such as Sidra[place] and Excedrin) for 48 hours after your procedure. Bleeding: If bleeding occurs, DO NOT remove the bandage. Put firm pressure on the area with gauze for 20 minutes without peeking.  If the bleeding continues, apply

## 2023-08-01 NOTE — PROGRESS NOTES
PRE-PROCEDURE SCREENING    Pacemaker/ICD: No  Difficulty with numbing in the past: No  Local Anesthesia Reaction/passing out: No  Latex or adhesive allergy:  No  Bleeding/Clotting Disorders: No  Anticoagulant Therapy: No  Joint prosthesis: No  Artificial Heart Valve: No  Stroke or Seizures: Yes, hx seizures  Organ Transplant or Lymphoma: No  Immunosuppression: No  Respiratory Problems: No
normal saline solution, dried off, Aquaphor ointment was applied, and the wound was covered. A dressing was applied for stabilization and light pressure. The patient was given detailed oral and written instructions on postoperative care. There were no complications. The patient left the Unit in good medical condition. FOLLOW-UP:  The patient will return for suture removal in 14-21 days.

## 2023-08-02 ENCOUNTER — TELEPHONE (OUTPATIENT)
Dept: SURGERY | Age: 48
End: 2023-08-02

## 2023-08-02 NOTE — TELEPHONE ENCOUNTER
The patient was in the office on 8/1/2023 for Mohs located on the Occipital Scalp with ILC repair. The patient tolerated the procedure well and left the office in good condition. Pain level on post-operative day 1:  no pain just uncomfortable sleeping. I went over how to use (airplane pillow) in pillow case to keep his scalp area off the pillow for comfort and he apprecitated this idea. No Tylenol has been needed. Any bleeding episode that required pressure to be held, bandage change or a call to the office or MD?  no     Any other issues?:  no    A post-operative telephone call was placed at 9:55am, 8/2/2023, in order to check on the patient's recovery process. The patient reported doing well and had no complaints other than those listed above, if any. All of the patient's questions were answered. Advised to call w/any questions/concerns.

## 2023-08-15 ENCOUNTER — NURSE ONLY (OUTPATIENT)
Dept: SURGERY | Age: 48
End: 2023-08-15

## 2023-08-15 DIAGNOSIS — Z48.02 VISIT FOR SUTURE REMOVAL: Primary | ICD-10-CM

## 2023-08-15 NOTE — PROGRESS NOTES
S:  The patient is here for suture removal s/p Mohs surgery on the occipital scalp and Intermediate layered closure repair, 2 week(s) ago. The site appears well-healed without signs of infection (redness, pain or discharge). The sutures were removed. Wound care and activity instructions given. The patient was scheduled for follow-up prn for scar/wound check. The patient was scheduled for f/u with General Dermatology per their instructions.

## 2023-09-26 ENCOUNTER — PATIENT MESSAGE (OUTPATIENT)
Dept: DERMATOLOGY | Age: 48
End: 2023-09-26

## 2023-09-27 ENCOUNTER — OFFICE VISIT (OUTPATIENT)
Dept: DERMATOLOGY | Age: 48
End: 2023-09-27
Payer: COMMERCIAL

## 2023-09-27 DIAGNOSIS — S00.03XA HEMATOMA OF OCCIPITAL REGION OF SCALP: Primary | ICD-10-CM

## 2023-09-27 PROCEDURE — 99212 OFFICE O/P EST SF 10 MIN: CPT | Performed by: DERMATOLOGY

## 2023-09-27 NOTE — PROGRESS NOTES
[facility] Dermatology  Evon Huerta MD  [phone]      Romulo Iverson  1975    53 y.o. male     Date of Visit: 9/27/2023    Chief Complaint: skin lesion    History of Present Illness:    About 7 weeks ago he had Mohs surgery for a nodular BCC on the posterior portion of the scalp. He presents today for about a 1 week history of a dark lesion noted at the surgical scar. It is asymptomatic but has enlarged. Review of Systems:  Gen: Feels well, good sense of health. Past Medical History, Family History, Surgical History, Medications and Allergies reviewed. Past Medical History:   Diagnosis Date    Basal cell carcinoma     Localization-related (focal) (partial) epilepsy and epileptic syndromes with complex partial seizures, without mention of intractable epilepsy 1988    last seizure was in 1992     Past Surgical History:   Procedure Laterality Date    MOHS SURGERY  07/09/2020       No Known Allergies  Outpatient Medications Marked as Taking for the 9/27/23 encounter (Office Visit) with Jordan Sheehan MD   Medication Sig Dispense Refill    levETIRAcetam (KEPPRA XR) 500 MG TB24 extended release tablet Take 2 tablets at night 180 tablet 0    triamcinolone (KENALOG) 0.1 % cream Apply to affected areas of the skin twice daily for up to 2 weeks or until improved. (Patient taking differently: as needed Apply to affected areas of the skin twice daily for up to 2 weeks or until improved prn) 454 g 2         Physical Examination       Well appearing. 1. Occipital scalp with a linear scar with small violaceous papule within the center. Assessment and Plan     1. Small hematoma of occipital region of scalp within scar    The skin was cleansed and the hematoma was drained with pressure. There was no suture material or pus present.           --Jordan Sheehan MD

## 2023-10-15 DIAGNOSIS — G40.209 PARTIAL EPILEPSY WITH IMPAIRMENT OF CONSCIOUSNESS (HCC): ICD-10-CM

## 2023-10-16 RX ORDER — LEVETIRACETAM 500 MG/1
TABLET, EXTENDED RELEASE ORAL
Qty: 180 TABLET | Refills: 0 | Status: SHIPPED | OUTPATIENT
Start: 2023-10-16

## 2023-11-09 ENCOUNTER — HOSPITAL ENCOUNTER (OUTPATIENT)
Age: 48
Discharge: HOME | End: 2023-11-09
Payer: COMMERCIAL

## 2023-11-09 DIAGNOSIS — G40.209 PARTIAL EPILEPSY WITH IMPAIRMENT OF CONSCIOUSNESS (HCC): ICD-10-CM

## 2023-11-09 LAB
LEVETIRACETAM SERPL-MCNC: <2 UG/ML (ref 6–46)
MEDICATION DOSE-MCNC: ABNORMAL

## 2023-11-09 PROCEDURE — 80177 DRUG SCRN QUAN LEVETIRACETAM: CPT

## 2023-11-15 ENCOUNTER — TELEMEDICINE (OUTPATIENT)
Dept: NEUROLOGY | Age: 48
End: 2023-11-15
Payer: COMMERCIAL

## 2023-11-15 DIAGNOSIS — T88.7XXA MEDICATION SIDE EFFECT: ICD-10-CM

## 2023-11-15 DIAGNOSIS — G40.209 PARTIAL EPILEPSY WITH IMPAIRMENT OF CONSCIOUSNESS (HCC): Primary | ICD-10-CM

## 2023-11-15 PROCEDURE — 99214 OFFICE O/P EST MOD 30 MIN: CPT | Performed by: PSYCHIATRY & NEUROLOGY

## 2023-11-15 RX ORDER — OXCARBAZEPINE 150 MG/1
TABLET, FILM COATED ORAL
Qty: 60 TABLET | Refills: 0 | Status: SHIPPED | OUTPATIENT
Start: 2023-11-15

## 2023-11-15 NOTE — PROGRESS NOTES
[facility]   Neurology followup    Subjective:   CC/HP  This was a video visit  History was obtained from the patient and his wife. Patient has not had any seizures. Patient is currently on Keppra  mg 2 tablets daily for a total of 1000 mg. Patient states that he definitely takes his medications very regularly and faithfully. However his recent Keppra level was low at less than 0.2. Patient's last seizure was in 1992. Background history:   Patient has known history of seizure disorder. Patient apparently had not been taking Keppra for several years but had been doing well without any clinical seizures. However when he told me about it we decided to get an EEG. Unfortunately the EEG showed spike and slow wave activity on the left hemisphere with some spread to the right side    REVIEW OF SYSTEMS    Constitutional:  []   Chills   [x]  Fatigue   []  Fevers   []  Malaise   []  Weight loss     [] Denies all of the above    Respiratory:   []  Cough    []  Shortness of breath         [x] Denies all of the above     Cardiovascular:   []  Chest pain    []  Exertional chest pressure/discomfort           [] Palpitations    []  Syncope     [x] Denies all of the above        Past Medical History:   Diagnosis Date    Basal cell carcinoma     Localization-related (focal) (partial) epilepsy and epileptic syndromes with complex partial seizures, without mention of intractable epilepsy 1988    last seizure was in 13     Family History   Problem Relation Age of Onset    Cancer Mother     High Blood Pressure Mother     Diabetes Father     High Blood Pressure Father     Cancer Father         melanoma     Social History     Socioeconomic History    Marital status:    Tobacco Use    Smoking status: Never    Smokeless tobacco: Never   Vaping Use    Vaping Use: Never used   Substance and Sexual Activity    Alcohol use: No    Drug use: No        Objective:  Exam:  There were no vitals taken for this visit.   This

## 2023-12-13 ENCOUNTER — HOSPITAL ENCOUNTER (OUTPATIENT)
Age: 48
Discharge: HOME | End: 2023-12-13
Payer: COMMERCIAL

## 2023-12-13 DIAGNOSIS — G40.209 PARTIAL EPILEPSY WITH IMPAIRMENT OF CONSCIOUSNESS (HCC): ICD-10-CM

## 2023-12-13 LAB
ALT SERPL-CCNC: 47 U/L (ref 10–40)
AST SERPL-CCNC: 61 U/L (ref 15–37)
BASOPHILS # BLD: 0 K/UL (ref 0–0.2)
BASOPHILS NFR BLD: 0.5 %
DEPRECATED RDW RBC AUTO: 15.3 % (ref 12.4–15.4)
EOSINOPHIL # BLD: 0.1 K/UL (ref 0–0.6)
EOSINOPHIL NFR BLD: 5.2 %
HCT VFR BLD AUTO: 37.9 % (ref 40.5–52.5)
HGB BLD-MCNC: 13.1 G/DL (ref 13.5–17.5)
LYMPHOCYTES # BLD: 0.3 K/UL (ref 1–5.1)
LYMPHOCYTES NFR BLD: 17.3 %
MCH RBC QN AUTO: 28.9 PG (ref 26–34)
MCHC RBC AUTO-ENTMCNC: 34.5 G/DL (ref 31–36)
MCV RBC AUTO: 83.7 FL (ref 80–100)
MONOCYTES # BLD: 0.2 K/UL (ref 0–1.3)
MONOCYTES NFR BLD: 8.9 %
NEUTROPHILS # BLD: 1.3 K/UL (ref 1.7–7.7)
NEUTROPHILS NFR BLD: 68.1 %
PLATELET # BLD AUTO: ABNORMAL K/UL (ref 135–450)
PLATELET BLD QL SMEAR: ABNORMAL
PMV BLD AUTO: ABNORMAL FL (ref 5–10.5)
RBC # BLD AUTO: 4.53 M/UL (ref 4.2–5.9)
SLIDE REVIEW: ABNORMAL
WBC # BLD AUTO: 2 K/UL (ref 4–11)

## 2023-12-13 PROCEDURE — 84450 TRANSFERASE (AST) (SGOT): CPT

## 2023-12-13 PROCEDURE — 84460 ALANINE AMINO (ALT) (SGPT): CPT

## 2023-12-13 PROCEDURE — 36415 COLL VENOUS BLD VENIPUNCTURE: CPT

## 2023-12-13 PROCEDURE — 80183 DRUG SCRN QUANT OXCARBAZEPIN: CPT

## 2023-12-13 PROCEDURE — 85025 COMPLETE CBC W/AUTO DIFF WBC: CPT

## 2023-12-14 ENCOUNTER — OFFICE VISIT (OUTPATIENT)
Dept: NEUROLOGY | Age: 48
End: 2023-12-14
Payer: COMMERCIAL

## 2023-12-14 VITALS
DIASTOLIC BLOOD PRESSURE: 103 MMHG | WEIGHT: 306 LBS | SYSTOLIC BLOOD PRESSURE: 190 MMHG | HEART RATE: 69 BPM | HEIGHT: 70 IN | BODY MASS INDEX: 43.81 KG/M2

## 2023-12-14 DIAGNOSIS — G40.209 PARTIAL EPILEPSY WITH IMPAIRMENT OF CONSCIOUSNESS (HCC): ICD-10-CM

## 2023-12-14 PROCEDURE — 99214 OFFICE O/P EST MOD 30 MIN: CPT | Performed by: PSYCHIATRY & NEUROLOGY

## 2023-12-14 RX ORDER — LEVETIRACETAM 500 MG/1
TABLET, FILM COATED, EXTENDED RELEASE ORAL
Qty: 180 TABLET | Refills: 1 | Status: CANCELLED | OUTPATIENT
Start: 2023-12-14

## 2023-12-14 RX ORDER — OXCARBAZEPINE 150 MG/1
TABLET, FILM COATED ORAL
Qty: 180 TABLET | Refills: 1 | Status: CANCELLED | OUTPATIENT
Start: 2023-12-14

## 2023-12-14 NOTE — PROGRESS NOTES
[facility]   Neurology followup    Subjective:   CC/HP  History was obtained from patient  Patient has known partial complex seizures and these are well controlled. Patient's last seizure was in 1992. Patient apparently had not been taking Keppra for several years but had been doing well without any clinical seizures. However when he told me about it that he had not been taking medications for a while we decided to get an EEG. Unfortunately the EEG showed spike and slow wave activity on the left hemisphere with some spread to the right side. So we put him back on low-dose Keppra during the last office visit. She was unable to tolerate the Keppra because of side effects including dizziness and sedation. Then during the last office visit I stopped the Keppra and switched him to oxcarbazepine.     REVIEW OF SYSTEMS    Constitutional:  []   Chills   [x]  Fatigue   []  Fevers   []  Malaise   []  Weight loss     [] Denies all of the above    Respiratory:   []  Cough    []  Shortness of breath         [x] Denies all of the above     Cardiovascular:   []  Chest pain    []  Exertional chest pressure/discomfort           [] Palpitations    []  Syncope     [x] Denies all of the above        Past Medical History:   Diagnosis Date    Basal cell carcinoma     Localization-related (focal) (partial) epilepsy and epileptic syndromes with complex partial seizures, without mention of intractable epilepsy 1988    last seizure was in 13     Family History   Problem Relation Age of Onset    Cancer Mother     High Blood Pressure Mother     Diabetes Father     High Blood Pressure Father     Cancer Father         melanoma     Social History     Socioeconomic History    Marital status:      Spouse name: None    Number of children: None    Years of education: None    Highest education level: None   Tobacco Use    Smoking status: Never    Smokeless tobacco: Never   Vaping Use    Vaping Use: Never used   Substance and

## 2023-12-16 LAB — 10OH-CARBAZEPINE SERPL-MCNC: 3 UG/ML (ref 3–35)

## 2024-01-03 ENCOUNTER — OFFICE VISIT (OUTPATIENT)
Dept: DERMATOLOGY | Age: 49
End: 2024-01-03
Payer: COMMERCIAL

## 2024-01-03 DIAGNOSIS — D22.9 MULTIPLE NEVI: ICD-10-CM

## 2024-01-03 DIAGNOSIS — L84 CALLUS: Primary | ICD-10-CM

## 2024-01-03 DIAGNOSIS — Z85.828 HISTORY OF BASAL CELL CARCINOMA: ICD-10-CM

## 2024-01-03 DIAGNOSIS — L81.7 SCHAMBERG'S CAPILLARITIS: ICD-10-CM

## 2024-01-03 DIAGNOSIS — L57.0 AK (ACTINIC KERATOSIS): ICD-10-CM

## 2024-01-03 PROCEDURE — 99213 OFFICE O/P EST LOW 20 MIN: CPT | Performed by: DERMATOLOGY

## 2024-01-03 RX ORDER — FLUOROURACIL 50 MG/G
CREAM TOPICAL
Qty: 40 G | Refills: 0 | Status: SHIPPED | OUTPATIENT
Start: 2024-01-03

## 2024-01-03 NOTE — PROGRESS NOTES
[facility] Dermatology  Sanju Mayers MD  [phone]      Femi Anderson  1975    48 y.o. male     Date of Visit: 1/3/2024    Chief Complaint: skin lesions    History of Present Illness:    1.  He reports a longstanding asymptomatic lesion of the right wrist.  This is the location where he rests his right upper extremity when working/typing.    2.  He also returns for history of actinic keratoses of the vertex scalp and temples-treated in the past with Efudex cream (for 14 to 21 days).  He reports few scaly lesions on the scalp today.    3.  He has multiple moles on the trunk and extremities-not aware of any changes in size, color, or shape.    4.  He has asymptomatic discoloration of the skin of the lower legs.    5.  He has a history of 2 basal cell carcinomas-denies any signs of recurrence.    Superficial nodular BCC on the occipital scalp-treated with Mohs by Dr. Elliott on 8/1/2023.  Nodular basal cell carcinoma on the left cheek-treated with Mohs by Dr. Elliott on 7/9/2020.     Father with history of melanoma.    Review of Systems:  Gen: Feels well, good sense of health.      Past Medical History, Family History, Surgical History, Medications and Allergies reviewed.    Past Medical History:   Diagnosis Date    Basal cell carcinoma     Localization-related (focal) (partial) epilepsy and epileptic syndromes with complex partial seizures, without mention of intractable epilepsy 1988    last seizure was in 1992     Past Surgical History:   Procedure Laterality Date    MOHS SURGERY  07/09/2020       No Known Allergies  Outpatient Medications Marked as Taking for the 1/3/24 encounter (Office Visit) with Sanju Mayers MD   Medication Sig Dispense Refill    triamcinolone (KENALOG) 0.1 % cream Apply to affected areas of the skin twice daily for up to 2 weeks or until improved. (Patient taking differently: as needed Apply to affected areas of the skin twice daily for up to 2 weeks or until

## 2024-01-22 ENCOUNTER — TELEPHONE (OUTPATIENT)
Dept: NEUROLOGY | Age: 49
End: 2024-01-22

## 2024-01-22 ENCOUNTER — OFFICE VISIT (OUTPATIENT)
Dept: NEUROLOGY | Age: 49
End: 2024-01-22
Payer: COMMERCIAL

## 2024-01-22 VITALS
HEART RATE: 77 BPM | SYSTOLIC BLOOD PRESSURE: 163 MMHG | DIASTOLIC BLOOD PRESSURE: 91 MMHG | WEIGHT: 306 LBS | BODY MASS INDEX: 43.91 KG/M2

## 2024-01-22 DIAGNOSIS — G40.209 PARTIAL EPILEPSY WITH IMPAIRMENT OF CONSCIOUSNESS (HCC): Primary | ICD-10-CM

## 2024-01-22 DIAGNOSIS — D61.818 PANCYTOPENIA (HCC): ICD-10-CM

## 2024-01-22 PROCEDURE — 99214 OFFICE O/P EST MOD 30 MIN: CPT | Performed by: PSYCHIATRY & NEUROLOGY

## 2024-01-22 NOTE — PROGRESS NOTES
he patient came today for follow up regarding: Epilepsy      This is my first encounter with the patient.  The patient was seen by Dr. Wilkerson.  I was able to review the patient's record, imaging, notes from different physicians and recent events.    The patient still having seizure when he was 13 years old.  Description Aura blurred vision followed by complex partial seizure for few minutes.  His last seizure was in 1992.  He used to be on Keppra however he was not consistent with such medication for quite some time.  He only start taking Keppra consistently for the last few months.  Patient developed some dizziness and lightheadedness with Keppra and he decided such medication last month.  Patient had a blood test last month which showed white count of 2.5, hemoglobin 13 and platelet 46.  He denies any weight loss or night sweats.  He does not have PCP.  No recent fever or chills.  No bladder or bowel issues skin rash or excessive bleeding.  EEG from March of last year showed generalized epileptiform discharges.  He cannot recall last MRI.  He denies use of drugs.  Other review of system was unremarkable.            Exam:   Constitutional:   Vitals:    01/22/24 1053   BP: (!) 163/91   Pulse: 77   Weight: (!) 138.8 kg (306 lb)       General appearance:  Normal development and appear in no acute distress.   Mental Status:   Oriented to person, place, problem, and time.    Memory: Good immediate recall.  Intact remote memory  Normal attention span and concentration.  Language: intact naming, repeating and fluency   Good fund of Knowledge. Aware of current events and vocabulary   Cranial Nerves:   II: Pupils: equal, round, reactive to light  III,IV,VI: Extra Ocular Movements are intact. No nystagmus  V: Facial sensation is intact   VII: Facial strength and movements: intact and symmetric  XII: Tongue movements are normal  Musculoskeletal: 5/5 in all 4 extremities.   Tone: Normal tone.   Coordination: no tremors or

## 2024-01-23 ENCOUNTER — OFFICE VISIT (OUTPATIENT)
Dept: PRIMARY CARE CLINIC | Age: 49
End: 2024-01-23
Payer: COMMERCIAL

## 2024-01-23 VITALS
SYSTOLIC BLOOD PRESSURE: 140 MMHG | DIASTOLIC BLOOD PRESSURE: 80 MMHG | BODY MASS INDEX: 45.1 KG/M2 | OXYGEN SATURATION: 96 % | HEART RATE: 86 BPM | RESPIRATION RATE: 20 BRPM | HEIGHT: 70 IN | TEMPERATURE: 98.1 F | WEIGHT: 315 LBS

## 2024-01-23 DIAGNOSIS — D61.818 PANCYTOPENIA (HCC): ICD-10-CM

## 2024-01-23 DIAGNOSIS — G40.209 PARTIAL EPILEPSY WITH IMPAIRMENT OF CONSCIOUSNESS (HCC): ICD-10-CM

## 2024-01-23 DIAGNOSIS — Z00.00 ANNUAL PHYSICAL EXAM: ICD-10-CM

## 2024-01-23 DIAGNOSIS — Z12.11 COLON CANCER SCREENING: ICD-10-CM

## 2024-01-23 DIAGNOSIS — Z86.19 HX OF LYME DISEASE: ICD-10-CM

## 2024-01-23 DIAGNOSIS — Z76.89 ENCOUNTER TO ESTABLISH CARE: Primary | ICD-10-CM

## 2024-01-23 DIAGNOSIS — R53.82 CHRONIC FATIGUE: ICD-10-CM

## 2024-01-23 PROCEDURE — 99386 PREV VISIT NEW AGE 40-64: CPT

## 2024-01-23 SDOH — ECONOMIC STABILITY: INCOME INSECURITY: HOW HARD IS IT FOR YOU TO PAY FOR THE VERY BASICS LIKE FOOD, HOUSING, MEDICAL CARE, AND HEATING?: NOT HARD AT ALL

## 2024-01-23 SDOH — HEALTH STABILITY: PHYSICAL HEALTH: ON AVERAGE, HOW MANY MINUTES DO YOU ENGAGE IN EXERCISE AT THIS LEVEL?: 30 MIN

## 2024-01-23 SDOH — HEALTH STABILITY: PHYSICAL HEALTH: ON AVERAGE, HOW MANY DAYS PER WEEK DO YOU ENGAGE IN MODERATE TO STRENUOUS EXERCISE (LIKE A BRISK WALK)?: 1 DAY

## 2024-01-23 SDOH — ECONOMIC STABILITY: HOUSING INSECURITY
IN THE LAST 12 MONTHS, WAS THERE A TIME WHEN YOU DID NOT HAVE A STEADY PLACE TO SLEEP OR SLEPT IN A SHELTER (INCLUDING NOW)?: NO

## 2024-01-23 SDOH — ECONOMIC STABILITY: FOOD INSECURITY: WITHIN THE PAST 12 MONTHS, YOU WORRIED THAT YOUR FOOD WOULD RUN OUT BEFORE YOU GOT MONEY TO BUY MORE.: NEVER TRUE

## 2024-01-23 SDOH — ECONOMIC STABILITY: FOOD INSECURITY: WITHIN THE PAST 12 MONTHS, THE FOOD YOU BOUGHT JUST DIDN'T LAST AND YOU DIDN'T HAVE MONEY TO GET MORE.: NEVER TRUE

## 2024-01-23 ASSESSMENT — PATIENT HEALTH QUESTIONNAIRE - PHQ9
SUM OF ALL RESPONSES TO PHQ9 QUESTIONS 1 & 2: 0
SUM OF ALL RESPONSES TO PHQ QUESTIONS 1-9: 0
1. LITTLE INTEREST OR PLEASURE IN DOING THINGS: 0
SUM OF ALL RESPONSES TO PHQ QUESTIONS 1-9: 0
2. FEELING DOWN, DEPRESSED OR HOPELESS: 0

## 2024-01-23 ASSESSMENT — ENCOUNTER SYMPTOMS
WHEEZING: 0
COUGH: 0
CHEST TIGHTNESS: 0
DIARRHEA: 0
VOMITING: 0
ABDOMINAL PAIN: 0
NAUSEA: 0
COLOR CHANGE: 0
SHORTNESS OF BREATH: 0
CONSTIPATION: 0

## 2024-01-23 NOTE — PROGRESS NOTES
medications for this visit.       Return in about 1 year (around 1/23/2025) for Annual physical.    Electronically signed by ADAN Liu CNP on 1/23/2024 at 4:20 PM

## 2024-02-07 ENCOUNTER — HOSPITAL ENCOUNTER (OUTPATIENT)
Dept: ULTRASOUND IMAGING | Age: 49
Discharge: HOME | End: 2024-02-07
Attending: INTERNAL MEDICINE
Payer: COMMERCIAL

## 2024-02-07 DIAGNOSIS — D72.818 OTHER DECREASED WHITE BLOOD CELL (WBC) COUNT: ICD-10-CM

## 2024-02-07 DIAGNOSIS — D69.6 THROMBOCYTOPENIA (HCC): ICD-10-CM

## 2024-02-07 PROCEDURE — 76705 ECHO EXAM OF ABDOMEN: CPT

## 2024-02-08 ENCOUNTER — TELEPHONE (OUTPATIENT)
Dept: NEUROLOGY | Age: 49
End: 2024-02-08

## 2024-02-08 NOTE — TELEPHONE ENCOUNTER
Pt calling in to report that he was able to get a new PCP and Hematology, he states that he was told his liver and spleen are enlarged and that he will be having a scan done. Pt appt for 2.19.24 has been canceled until after he sees Dr. Nieto to review his scan.

## 2024-02-13 ENCOUNTER — HOSPITAL ENCOUNTER (OUTPATIENT)
Dept: CT IMAGING | Age: 49
Discharge: HOME | End: 2024-02-13
Attending: INTERNAL MEDICINE
Payer: COMMERCIAL

## 2024-02-13 DIAGNOSIS — D72.810 LYMPHOPENIA: ICD-10-CM

## 2024-02-13 PROCEDURE — 74177 CT ABD & PELVIS W/CONTRAST: CPT

## 2024-02-13 PROCEDURE — 6360000004 HC RX CONTRAST MEDICATION: Performed by: INTERNAL MEDICINE

## 2024-02-13 RX ADMIN — IOHEXOL 50 ML: 240 INJECTION, SOLUTION INTRATHECAL; INTRAVASCULAR; INTRAVENOUS; ORAL at 13:18

## 2024-02-13 RX ADMIN — IOPAMIDOL 75 ML: 755 INJECTION, SOLUTION INTRAVENOUS at 13:18

## 2024-02-23 ENCOUNTER — HOSPITAL ENCOUNTER (OUTPATIENT)
Age: 49
Discharge: HOME | End: 2024-02-23
Payer: COMMERCIAL

## 2024-02-23 DIAGNOSIS — Z00.00 ANNUAL PHYSICAL EXAM: ICD-10-CM

## 2024-02-23 DIAGNOSIS — R53.82 CHRONIC FATIGUE: ICD-10-CM

## 2024-02-23 LAB
ALBUMIN SERPL-MCNC: 4.4 G/DL (ref 3.4–5)
ALBUMIN/GLOB SERPL: 1.4 {RATIO} (ref 1.1–2.2)
ALP SERPL-CCNC: 87 U/L (ref 40–129)
ALT SERPL-CCNC: 73 U/L (ref 10–40)
ANION GAP SERPL CALCULATED.3IONS-SCNC: 11 MMOL/L (ref 3–16)
AST SERPL-CCNC: 80 U/L (ref 15–37)
BILIRUB SERPL-MCNC: 1.8 MG/DL (ref 0–1)
BUN SERPL-MCNC: 11 MG/DL (ref 7–20)
CALCIUM SERPL-MCNC: 9.6 MG/DL (ref 8.3–10.6)
CHLORIDE SERPL-SCNC: 106 MMOL/L (ref 99–110)
CHOLEST SERPL-MCNC: 126 MG/DL (ref 0–199)
CO2 SERPL-SCNC: 25 MMOL/L (ref 21–32)
CREAT SERPL-MCNC: 0.7 MG/DL (ref 0.9–1.3)
GFR SERPLBLD CREATININE-BSD FMLA CKD-EPI: >60 ML/MIN/{1.73_M2}
GLUCOSE SERPL-MCNC: 115 MG/DL (ref 70–99)
HDLC SERPL-MCNC: 49 MG/DL (ref 40–60)
LDL CHOLESTEROL CALCULATED: 59 MG/DL
POTASSIUM SERPL-SCNC: 4.1 MMOL/L (ref 3.5–5.1)
PROT SERPL-MCNC: 7.6 G/DL (ref 6.4–8.2)
SODIUM SERPL-SCNC: 142 MMOL/L (ref 136–145)
TRIGL SERPL-MCNC: 89 MG/DL (ref 0–150)
TSH SERPL DL<=0.005 MIU/L-ACNC: 1.09 UIU/ML (ref 0.27–4.2)
VLDLC SERPL CALC-MCNC: 18 MG/DL

## 2024-02-23 PROCEDURE — 36415 COLL VENOUS BLD VENIPUNCTURE: CPT

## 2024-02-23 PROCEDURE — 84443 ASSAY THYROID STIM HORMONE: CPT

## 2024-02-23 PROCEDURE — 83036 HEMOGLOBIN GLYCOSYLATED A1C: CPT

## 2024-02-23 PROCEDURE — 80061 LIPID PANEL: CPT

## 2024-02-23 PROCEDURE — 80053 COMPREHEN METABOLIC PANEL: CPT

## 2024-02-24 ENCOUNTER — HOSPITAL ENCOUNTER (OUTPATIENT)
Age: 49
Discharge: HOME | End: 2024-02-24
Payer: COMMERCIAL

## 2024-02-24 LAB
ALBUMIN SERPL-MCNC: 4.2 G/DL (ref 3.4–5)
ALBUMIN/GLOB SERPL: 1.3 {RATIO} (ref 1.1–2.2)
ALP SERPL-CCNC: 83 U/L (ref 40–129)
ALT SERPL-CCNC: 77 U/L (ref 10–40)
ANION GAP SERPL CALCULATED.3IONS-SCNC: 9 MMOL/L (ref 3–16)
AST SERPL-CCNC: 73 U/L (ref 15–37)
BILIRUB SERPL-MCNC: 1.7 MG/DL (ref 0–1)
BUN SERPL-MCNC: 11 MG/DL (ref 7–20)
CALCIUM SERPL-MCNC: 9.1 MG/DL (ref 8.3–10.6)
CHLORIDE SERPL-SCNC: 108 MMOL/L (ref 99–110)
CO2 SERPL-SCNC: 26 MMOL/L (ref 21–32)
CREAT SERPL-MCNC: 0.8 MG/DL (ref 0.9–1.3)
EST. AVERAGE GLUCOSE BLD GHB EST-MCNC: 96.8 MG/DL
FERRITIN SERPL IA-MCNC: 103.9 NG/ML (ref 30–400)
GFR SERPLBLD CREATININE-BSD FMLA CKD-EPI: >60 ML/MIN/{1.73_M2}
GLUCOSE SERPL-MCNC: 124 MG/DL (ref 70–99)
HBA1C MFR BLD: 5 %
HBV SURFACE AB SERPL IA-ACNC: >1000 MIU/ML
HBV SURFACE AG SERPL QL IA: NORMAL
HCV AB SERPL QL IA: NORMAL
INR PPP: 1.38 (ref 0.84–1.16)
IRON SATN MFR SERPL: 24 % (ref 20–50)
IRON SERPL-MCNC: 84 UG/DL (ref 59–158)
POTASSIUM SERPL-SCNC: 4 MMOL/L (ref 3.5–5.1)
PROT SERPL-MCNC: 7.4 G/DL (ref 6.4–8.2)
PROTHROMBIN TIME: 16.9 SEC (ref 11.5–14.8)
SODIUM SERPL-SCNC: 143 MMOL/L (ref 136–145)
TIBC SERPL-MCNC: 346 UG/DL (ref 260–445)

## 2024-02-24 PROCEDURE — 86015 ACTIN ANTIBODY EACH: CPT

## 2024-02-24 PROCEDURE — 36415 COLL VENOUS BLD VENIPUNCTURE: CPT

## 2024-02-24 PROCEDURE — 86708 HEPATITIS A ANTIBODY: CPT

## 2024-02-24 PROCEDURE — 80053 COMPREHEN METABOLIC PANEL: CPT

## 2024-02-24 PROCEDURE — 86706 HEP B SURFACE ANTIBODY: CPT

## 2024-02-24 PROCEDURE — 83540 ASSAY OF IRON: CPT

## 2024-02-24 PROCEDURE — 86038 ANTINUCLEAR ANTIBODIES: CPT

## 2024-02-24 PROCEDURE — 86803 HEPATITIS C AB TEST: CPT

## 2024-02-24 PROCEDURE — 85610 PROTHROMBIN TIME: CPT

## 2024-02-24 PROCEDURE — 83550 IRON BINDING TEST: CPT

## 2024-02-24 PROCEDURE — 87340 HEPATITIS B SURFACE AG IA: CPT

## 2024-02-24 PROCEDURE — 82728 ASSAY OF FERRITIN: CPT

## 2024-02-25 LAB
ANA SER QL IA: NEGATIVE
HAV AB SER QL IA: POSITIVE
SMA IGG SER-ACNC: 14 UNITS (ref 0–19)

## 2024-02-27 LAB
SHBG SERPL-SCNC: 77 NMOL/L (ref 11–80)
TESTOST FREE SERPL-MCNC: 59.1 PG/ML (ref 47–244)
TESTOST SERPL-MCNC: 516 NG/DL (ref 220–1000)

## 2024-02-28 LAB
A1AT SERPL-MCNC: 159 MG/DL (ref 90–200)
AFP-TM SERPL-MCNC: 2.7 UG/L
CERULOPLASMIN SERPL-MCNC: 23 MG/DL (ref 15–30)

## 2024-03-04 LAB — MITOCHONDRIA M2 AB SER IA-ACNC: 1.5 U/ML (ref 0–4)

## 2024-03-04 NOTE — PROGRESS NOTES
Patient reached ____ yes  __X___ no   VM instructions left _X___ yes   phone number ___[phone]_____                                ____ no-office notified          Date _3/6/24________  Time _0845______  Arrival 0715  hosp-endo______    Nothing to eat or drink after midnight-follow your doctors prep instructions-this may include taking a second dose of your prep after midnight  Responsible adult 18 or older to stay on site while you are here-drive you home-stay with you after  Follow any instructions your doctors office has given you  Bring a complete list of all your medications and supplements including name,dose,how often taken the day of your procedure  If you normally take the following medications in the morning please do so the AM of your procedure with a small sip of water       Heart,blood pressure,seizure,thyroid or breathing medications-use your inhalers-bring any rescue inhalers with you DOS       DO NOT take blood pressure medications ending in \"tammi\" or \"pril\" the AM of procedure or evening prior  Dr Orr patients are not to take any medications the AM of surgery  Take half or your normal dose of any long acting insulins the night before your procedure-do not take any diabetic medications the AM of procedure  Follow your doctors instructions regarding stopping or taking  any blood thinners-if you do not have instructions-call them  Any questions call your doctor  Other ______________________________________________________________      VISITOR POLICY(subject to change)             The current policy is 2 visitors per patient.There are no children allowed.Mask at discretion of facility. Visiting hours are 8a-8p.Overnight visitors will be at the discretion of the nurse. All policies are subject to change.

## 2024-03-06 ENCOUNTER — ANESTHESIA (OUTPATIENT)
Dept: ENDOSCOPY | Age: 49
End: 2024-03-06
Payer: COMMERCIAL

## 2024-03-06 ENCOUNTER — ANESTHESIA EVENT (OUTPATIENT)
Dept: ENDOSCOPY | Age: 49
End: 2024-03-06
Payer: COMMERCIAL

## 2024-03-06 ENCOUNTER — HOSPITAL ENCOUNTER (OUTPATIENT)
Age: 49
Setting detail: OUTPATIENT SURGERY
Discharge: HOME | End: 2024-03-06
Attending: INTERNAL MEDICINE | Admitting: INTERNAL MEDICINE
Payer: COMMERCIAL

## 2024-03-06 VITALS
HEIGHT: 70 IN | BODY MASS INDEX: 41.66 KG/M2 | WEIGHT: 291 LBS | DIASTOLIC BLOOD PRESSURE: 81 MMHG | RESPIRATION RATE: 28 BRPM | HEART RATE: 69 BPM | TEMPERATURE: 97.4 F | SYSTOLIC BLOOD PRESSURE: 150 MMHG | OXYGEN SATURATION: 96 %

## 2024-03-06 DIAGNOSIS — Z12.11 COLON CANCER SCREENING: ICD-10-CM

## 2024-03-06 DIAGNOSIS — K75.81 NONALCOHOLIC STEATOHEPATITIS: ICD-10-CM

## 2024-03-06 PROCEDURE — 3700000000 HC ANESTHESIA ATTENDED CARE: Performed by: INTERNAL MEDICINE

## 2024-03-06 PROCEDURE — 3609012400 HC EGD TRANSORAL BIOPSY SINGLE/MULTIPLE: Performed by: INTERNAL MEDICINE

## 2024-03-06 PROCEDURE — 2709999900 HC NON-CHARGEABLE SUPPLY: Performed by: INTERNAL MEDICINE

## 2024-03-06 PROCEDURE — 3609010600 HC COLONOSCOPY POLYPECTOMY SNARE/COLD BIOPSY: Performed by: INTERNAL MEDICINE

## 2024-03-06 PROCEDURE — 7100000010 HC PHASE II RECOVERY - FIRST 15 MIN: Performed by: INTERNAL MEDICINE

## 2024-03-06 PROCEDURE — 88305 TISSUE EXAM BY PATHOLOGIST: CPT

## 2024-03-06 PROCEDURE — 7100000001 HC PACU RECOVERY - ADDTL 15 MIN: Performed by: INTERNAL MEDICINE

## 2024-03-06 PROCEDURE — 6360000002 HC RX W HCPCS: Performed by: NURSE ANESTHETIST, CERTIFIED REGISTERED

## 2024-03-06 PROCEDURE — 7100000000 HC PACU RECOVERY - FIRST 15 MIN: Performed by: INTERNAL MEDICINE

## 2024-03-06 PROCEDURE — 7100000011 HC PHASE II RECOVERY - ADDTL 15 MIN: Performed by: INTERNAL MEDICINE

## 2024-03-06 PROCEDURE — 3700000001 HC ADD 15 MINUTES (ANESTHESIA): Performed by: INTERNAL MEDICINE

## 2024-03-06 PROCEDURE — 2500000003 HC RX 250 WO HCPCS: Performed by: NURSE ANESTHETIST, CERTIFIED REGISTERED

## 2024-03-06 PROCEDURE — 2580000003 HC RX 258: Performed by: ANESTHESIOLOGY

## 2024-03-06 RX ORDER — LIDOCAINE HYDROCHLORIDE 20 MG/ML
INJECTION, SOLUTION INFILTRATION; PERINEURAL PRN
Status: DISCONTINUED | OUTPATIENT
Start: 2024-03-06 | End: 2024-03-06 | Stop reason: SDUPTHER

## 2024-03-06 RX ORDER — PROPOFOL 10 MG/ML
INJECTION, EMULSION INTRAVENOUS CONTINUOUS PRN
Status: DISCONTINUED | OUTPATIENT
Start: 2024-03-06 | End: 2024-03-06 | Stop reason: SDUPTHER

## 2024-03-06 RX ORDER — DEXMEDETOMIDINE HYDROCHLORIDE 100 UG/ML
INJECTION, SOLUTION INTRAVENOUS PRN
Status: DISCONTINUED | OUTPATIENT
Start: 2024-03-06 | End: 2024-03-06 | Stop reason: SDUPTHER

## 2024-03-06 RX ORDER — PROPOFOL 10 MG/ML
INJECTION, EMULSION INTRAVENOUS PRN
Status: DISCONTINUED | OUTPATIENT
Start: 2024-03-06 | End: 2024-03-06 | Stop reason: SDUPTHER

## 2024-03-06 RX ORDER — GLYCOPYRROLATE 0.2 MG/ML
INJECTION INTRAMUSCULAR; INTRAVENOUS PRN
Status: DISCONTINUED | OUTPATIENT
Start: 2024-03-06 | End: 2024-03-06 | Stop reason: SDUPTHER

## 2024-03-06 RX ORDER — CARVEDILOL 12.5 MG/1
12.5 TABLET ORAL 2 TIMES DAILY
Qty: 60 TABLET | Refills: 2 | Status: SHIPPED | OUTPATIENT
Start: 2024-03-06

## 2024-03-06 RX ORDER — SODIUM CHLORIDE 9 MG/ML
INJECTION, SOLUTION INTRAVENOUS CONTINUOUS
Status: DISCONTINUED | OUTPATIENT
Start: 2024-03-06 | End: 2024-03-06 | Stop reason: HOSPADM

## 2024-03-06 RX ORDER — OMEPRAZOLE 40 MG/1
40 CAPSULE, DELAYED RELEASE ORAL
Qty: 30 CAPSULE | Refills: 3 | Status: SHIPPED | OUTPATIENT
Start: 2024-03-06

## 2024-03-06 RX ADMIN — PROPOFOL 50 MG: 10 INJECTION, EMULSION INTRAVENOUS at 09:11

## 2024-03-06 RX ADMIN — PROPOFOL 150 MCG/KG/MIN: 10 INJECTION, EMULSION INTRAVENOUS at 08:57

## 2024-03-06 RX ADMIN — LIDOCAINE HYDROCHLORIDE 60 MG: 20 INJECTION, SOLUTION INFILTRATION; PERINEURAL at 08:55

## 2024-03-06 RX ADMIN — DEXMEDETOMIDINE HYDROCHLORIDE 10 MCG: 100 INJECTION, SOLUTION INTRAVENOUS at 09:05

## 2024-03-06 RX ADMIN — GLYCOPYRROLATE 0.2 MG: 0.2 INJECTION, SOLUTION INTRAMUSCULAR; INTRAVENOUS at 08:50

## 2024-03-06 RX ADMIN — PROPOFOL 50 MG: 10 INJECTION, EMULSION INTRAVENOUS at 09:07

## 2024-03-06 RX ADMIN — PROPOFOL 50 MG: 10 INJECTION, EMULSION INTRAVENOUS at 09:03

## 2024-03-06 RX ADMIN — PROPOFOL 50 MG: 10 INJECTION, EMULSION INTRAVENOUS at 08:55

## 2024-03-06 RX ADMIN — DEXMEDETOMIDINE HYDROCHLORIDE 10 MCG: 100 INJECTION, SOLUTION INTRAVENOUS at 09:00

## 2024-03-06 RX ADMIN — SODIUM CHLORIDE: 9 INJECTION, SOLUTION INTRAVENOUS at 07:49

## 2024-03-06 RX ADMIN — PROPOFOL 50 MG: 10 INJECTION, EMULSION INTRAVENOUS at 08:59

## 2024-03-06 ASSESSMENT — PAIN - FUNCTIONAL ASSESSMENT
PAIN_FUNCTIONAL_ASSESSMENT: NONE - DENIES PAIN
PAIN_FUNCTIONAL_ASSESSMENT: NONE - DENIES PAIN
PAIN_FUNCTIONAL_ASSESSMENT: WONG-BAKER FACES
PAIN_FUNCTIONAL_ASSESSMENT: NONE - DENIES PAIN

## 2024-03-06 ASSESSMENT — ENCOUNTER SYMPTOMS: SHORTNESS OF BREATH: 0

## 2024-03-06 NOTE — ANESTHESIA PRE PROCEDURE
results found for: \"COVID19\"        Anesthesia Evaluation  Patient summary reviewed and Nursing notes reviewed   no history of anesthetic complications:   Airway: Mallampati: I  TM distance: >3 FB   Neck ROM: full  Mouth opening: > = 3 FB   Dental: normal exam         Pulmonary:       (-) asthma and shortness of breath                           Cardiovascular:        (-) hypertension and  angina                Neuro/Psych:   (+) seizures (none since 92, off meds since nov):   (-) CVA           GI/Hepatic/Renal:   (+) morbid obesity     (-) GERD and liver disease       Endo/Other:    (+) blood dyscrasia::., malignancy/cancer.    (-) diabetes mellitus, hypothyroidism               Abdominal:             Vascular:     - PVD.      Other Findings:             Anesthesia Plan      MAC     ASA 3       Induction: intravenous.      Anesthetic plan and risks discussed with patient.      Plan discussed with CRNA.                    Marianne Briones MD   3/6/2024

## 2024-03-06 NOTE — PROGRESS NOTES
Discharge instructions review with patient and wife. All home medications have been reviewed, pt v/u. Discharge instructions signed. Pt discharged via wheelchair. Pt discharged with discharge paperwork and all belongings. Wife taking stable pt home.

## 2024-03-06 NOTE — PROGRESS NOTES
Pt arrived from ENDO to PACU bay 6. Reported received from ENDO staff. Pt arousable to voice. Pt on 4L non-rebreather NSR, and VSS. Will continue to monitor.

## 2024-03-06 NOTE — H&P
Pre-operative History and Physical    Patient: Femi Anderson  : 1975  Acct#:     History Obtained From:  patient    HISTORY OF PRESENT ILLNESS:    The patient is a 49 y.o. male with significant past medical history of compensated TAYLRO cirrhosis who presents with for EGD & colonoscopy.    Past Medical History:        Diagnosis Date    Anemia     Basal cell carcinoma     Localization-related (focal) (partial) epilepsy and epileptic syndromes with complex partial seizures, without mention of intractable epilepsy     last seizure was in     TAYLOR (nonalcoholic steatohepatitis)     Neutropenia (HCC)      Past Surgical History:        Procedure Laterality Date    MOHS SURGERY  2020     Medications Prior to Admission:   No current facility-administered medications on file prior to encounter.     Current Outpatient Medications on File Prior to Encounter   Medication Sig Dispense Refill    fluorouracil (EFUDEX) 5 % cream Apply topically 2 times daily for 14 days. 40 g 0    triamcinolone (KENALOG) 0.1 % cream Apply to affected areas of the skin twice daily for up to 2 weeks or until improved. (Patient taking differently: as needed Apply to affected areas of the skin twice daily for up to 2 weeks or until improved prn) 454 g 2     Allergies:  Patient has no known allergies.    History of allergic reaction to anesthesia:  No    Social History:   U/R  Family History:   No FH of colon cancer    PHYSICAL EXAM:      BP (!) 182/85   Pulse 64   Temp 97.8 °F (36.6 °C) (Temporal)   Resp 20   Ht 1.778 m (5' 10\")   Wt 132 kg (291 lb)   SpO2 100%   BMI 41.75 kg/m²  I        Heart:  Normal apical impulse, regular rate and rhythm, normal S1 and S2, no S3 or S4, and no murmur noted    Lungs:  No increased work of breathing, good air exchange, clear to auscultation bilaterally, no crackles or wheezing    Abdomen:  No scars, normal bowel sounds, soft, non-distended, non-tender, no masses palpated, no

## 2024-03-06 NOTE — ANESTHESIA POSTPROCEDURE EVALUATION
Department of Anesthesiology  Postprocedure Note    Patient: Femi Anderson  MRN: [id number]  YOB: 1975  Date of evaluation: 3/6/2024    Procedure Summary       Date: 03/06/24 Room / Location: [place] [address] / [facility]    Anesthesia Start: 0848 Anesthesia Stop: 0927    Procedures:       COLONOSCOPY POLYPECTOMY SNARE/COLD BIOPSY      ESOPHAGOGASTRODUODENOSCOPY BIOPSY (Abdomen) Diagnosis:       Colon cancer screening      Nonalcoholic steatohepatitis      (Colon cancer screening [Z12.11])      (Nonalcoholic steatohepatitis [K75.81])    Surgeons: Jacky Luther MD Responsible Provider: Marianne Briones MD    Anesthesia Type: MAC ASA Status: 3            Anesthesia Type: No value filed.    Boy Phase I: Boy Score: 9    Boy Phase II: Boy Score: 10    Anesthesia Post Evaluation    Patient location during evaluation: PACU  Level of consciousness: awake  Airway patency: patent  Cardiovascular status: hemodynamically stable  Respiratory status: acceptable  There was medical reason for not using a multimodal analgesia pain management approach.    No notable events documented.

## 2024-03-06 NOTE — BRIEF OP NOTE
Brief Postoperative Note      Patient: Femi Anderson  YOB: 1975  MRN: [id number]    Date of Procedure: 3/6/2024    Pre-Op Diagnosis Codes:     * Colon cancer screening [Z12.11]     * Nonalcoholic steatohepatitis [K75.81]       Procedure(s):  COLONOSCOPY POLYPECTOMY SNARE/COLD BIOPSY  ESOPHAGOGASTRODUODENOSCOPY BIOPSY    Surgeon(s):  Jacky Luther MD    Anesthesia: Monitor Anesthesia Care    Estimated Blood Loss (mL): Minimal    Complications: None    Specimens:   ID Type Source Tests Collected by Time Destination   A :  Tissue Stomach SURGICAL PATHOLOGY Jacky Luther MD 3/6/2024 0900    B :  Tissue Colon SURGICAL PATHOLOGY Jacky Luther MD 3/6/2024 0914    C :  Tissue Colon SURGICAL PATHOLOGY Jacky Luther MD 3/6/2024 0918      Findings:   EGD  Two columns grade 1 esophageal varices, left alone.  Antral erosions, biopsied to rule out H. pylori.    Colonoscopy  7 mm sessile ascending colon polyp, cold snared.  5 mm sessile sigmoid polyp, cold snared.  Prominent rectal veins, could be from rectal varices.  Hemorrhoids.    Plans:  Await biopsies.  Start omeprazole 40 mg daily and carvedilol 12.5 mg twice a day. Increase fiber intake to 30 g a day.  Recall EGD in 1 year for esophageal varices surveillance.  Recall colonoscopy in 5 years for polyp surveillance.    Electronically signed by Jacky Luther MD on 3/6/2024 at 9:25 AM

## 2024-04-10 RX ORDER — TRIAMCINOLONE ACETONIDE 1 MG/G
CREAM TOPICAL
Qty: 454 G | Refills: 0 | Status: SHIPPED | OUTPATIENT
Start: 2024-04-10

## 2024-05-02 NOTE — PROGRESS NOTES
Name_______________________________________Printed:____________________  Date and time of surgery__5/7/2024___0820___________________Arrival Time:__0645  [facility]______________   1. The instructions given regarding when and if a patient needs to stop oral intake prior to surgery varies.Follow the specific instructions you were given                  __x_Nothing to eat or to drink after Midnight the night before.                   ____Carbo loading or instructions will be given to select patients-if you have been given those instructions -please do the following                           The evening before your surgery after dinner before midnight drink 40 ounces of gatorade.If you are diabetic use sugar free.  The morning of surgery drink 40 ounces of water.This needs to be finished 3 hours prior to your surgery start time.    2. Take the following pills with a small sip of water on the morning of surgery_coreg, prilosec__________________________________________________                  Do not take blood pressure medications ending in pril or sartan the cayla prior to surgery or the morning of surgery. Dr Sol's patient are not to take any medications the AM of surgery.         3. Aspirin, Ibuprofen, Advil, Naproxen, Vitamin E and other Anti-inflammatory products and supplements should be stopped for 5 -7days before surgery or as directed by your physician.   4. Check with your Doctor regarding stopping Plavix, Coumadin,Eliquis, Lovenox,Effient,Pradaxa,Xarelto, Fragmin or other blood thinners and follow their instructions.   5. Do not smoke, and do not drink any alcoholic beverages 24 hours prior to surgery.  This includes NA Beer.Refrain from the usage of any recreational drugs.   6. You may brush your teeth and gargle the morning of surgery.  DO NOT SWALLOW WATER   7. You MUST make arrangements for a responsible adult to stay on site while you are here and take you home after your surgery. You will not be allowed to

## 2024-05-07 ENCOUNTER — ANESTHESIA EVENT (OUTPATIENT)
Dept: OPERATING ROOM | Age: 49
End: 2024-05-07
Payer: COMMERCIAL

## 2024-05-07 ENCOUNTER — ANESTHESIA (OUTPATIENT)
Dept: OPERATING ROOM | Age: 49
End: 2024-05-07
Payer: COMMERCIAL

## 2024-05-07 ENCOUNTER — HOSPITAL ENCOUNTER (OUTPATIENT)
Age: 49
Setting detail: OUTPATIENT SURGERY
Discharge: HOME | End: 2024-05-07
Attending: UROLOGY | Admitting: UROLOGY
Payer: COMMERCIAL

## 2024-05-07 VITALS
HEART RATE: 61 BPM | TEMPERATURE: 97.1 F | SYSTOLIC BLOOD PRESSURE: 167 MMHG | HEIGHT: 70 IN | BODY MASS INDEX: 41.09 KG/M2 | DIASTOLIC BLOOD PRESSURE: 72 MMHG | WEIGHT: 287 LBS | RESPIRATION RATE: 15 BRPM | OXYGEN SATURATION: 96 %

## 2024-05-07 PROCEDURE — 2580000003 HC RX 258: Performed by: UROLOGY

## 2024-05-07 RX ORDER — LIDOCAINE HYDROCHLORIDE 10 MG/ML
0.5 INJECTION, SOLUTION EPIDURAL; INFILTRATION; INTRACAUDAL; PERINEURAL ONCE
Status: DISCONTINUED | OUTPATIENT
Start: 2024-05-07 | End: 2024-05-08 | Stop reason: HOSPADM

## 2024-05-07 RX ORDER — SODIUM CHLORIDE, SODIUM LACTATE, POTASSIUM CHLORIDE, CALCIUM CHLORIDE 600; 310; 30; 20 MG/100ML; MG/100ML; MG/100ML; MG/100ML
INJECTION, SOLUTION INTRAVENOUS CONTINUOUS
Status: DISCONTINUED | OUTPATIENT
Start: 2024-05-07 | End: 2024-05-08 | Stop reason: HOSPADM

## 2024-05-07 RX ADMIN — SODIUM CHLORIDE, POTASSIUM CHLORIDE, SODIUM LACTATE AND CALCIUM CHLORIDE: 600; 310; 30; 20 INJECTION, SOLUTION INTRAVENOUS at 07:08

## 2024-05-07 ASSESSMENT — PAIN - FUNCTIONAL ASSESSMENT: PAIN_FUNCTIONAL_ASSESSMENT: 0-10

## 2024-05-07 NOTE — PROGRESS NOTES
Dr. Neves to bedside, pt passed kidney stone at home and will cancel surgery for today and pt will follow up in 6 months. PIV removed. Pt left ambulatory with wife.

## 2024-05-07 NOTE — H&P
Urology History and Physical  Inpatient Setting - [facility]    Provider: Anastacio Neves MD  Patient ID:  Admission Date: 2024 Name: Femi Anderson  OR Date: n/a MRN: [id number]   Patient Location: OR/NONE : 1975  Attending: Anastacio Neves MD Date of Service: 2024  PCP: Sharita Leon APRN - CNP     Diagnoses:  LEFT sided renal stone    Assessment/Plan:  Continue to the OR as planned for cysto with LEFT sided stent placement    All the patients questions were answered in detail. He understands the plan as listed above.                                                                                                                                               _____________________________________________________________    CC: Pre-op    HPI: Femi Anderson is a 49 y.o. male.  The history and physical exam was reviewed. The patient was seen and examined in pre-op. He had a chance to ask questions which were answered. There has been no interval changes. 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    Past Surgical History:  He has a past surgical history that includes Mohs surgery (2020); Colonoscopy (N/A, 3/6/2024); and Upper gastrointestinal endoscopy (N/A, 3/6/2024).     Allergies:   No Known Allergies    Social History:  He reports that he has never smoked. He has never used smokeless tobacco. He reports that he does not drink alcohol and does not use drugs.    Family History:  family history includes Anemia in his father; Cancer in his father and mother; Diabetes in his father; High Blood Pressure in his father and mother.    Medications:   Scheduled Meds:   lidocaine PF  0.5 mL

## 2024-06-11 ENCOUNTER — OFFICE VISIT (OUTPATIENT)
Dept: NEUROLOGY | Age: 49
End: 2024-06-11
Payer: COMMERCIAL

## 2024-06-11 VITALS — WEIGHT: 281 LBS | BODY MASS INDEX: 40.23 KG/M2 | HEIGHT: 70 IN

## 2024-06-11 DIAGNOSIS — D61.818 PANCYTOPENIA (HCC): ICD-10-CM

## 2024-06-11 DIAGNOSIS — K74.60 CIRRHOSIS OF LIVER WITHOUT ASCITES, UNSPECIFIED HEPATIC CIRRHOSIS TYPE (HCC): ICD-10-CM

## 2024-06-11 DIAGNOSIS — G40.209 PARTIAL EPILEPSY WITH IMPAIRMENT OF CONSCIOUSNESS (HCC): Primary | ICD-10-CM

## 2024-06-11 PROCEDURE — 99213 OFFICE O/P EST LOW 20 MIN: CPT | Performed by: PSYCHIATRY & NEUROLOGY

## 2024-06-11 NOTE — PROGRESS NOTES
The patient came today for follow up regarding: Epilepsy      Interval history:  Since his last visit, he had his follow-up with hematology and GI and was diagnosed with chronic liver cirrhosis.  He has not had any seizure since 1992.  He denies any recent oral or standing episodes.  He is only taking his Coreg and is being attention to lifestyle and diet restrictions.  No recent bleeding or headache.  No recent falling.  Recent blood test from last month reviewed.  Still with pancytopenia.  Had extensive workup since I saw him last time.  No recent MRI.  Other review of system was unremarkable.        Background history:  The patient still having seizure when he was 13 years old.  Description Aura blurred vision followed by complex partial seizure for few minutes.  His last seizure was in 1992.  He used to be on Keppra however he was not consistent with such medication for quite some time.  He only start taking Keppra consistently for the last few months.  Patient developed some dizziness and lightheadedness with Keppra and he decided such medication last month.  Patient had a blood test last month which showed white count of 2.5, hemoglobin 13 and platelet 46.  He denies any weight loss or night sweats.  He does not have PCP.  No recent fever or chills.  No bladder or bowel issues skin rash or excessive bleeding.  EEG from March of last year showed generalized epileptiform discharges.  He cannot recall last MRI.  He denies use of drugs.  Other review of system was unremarkable.            Exam:   Constitutional:   Vitals:    06/11/24 1325   Weight: 127.5 kg (281 lb)   Height: 1.778 m (5' 10\")       General appearance:  Normal development and appear in no acute distress.   Mental Status:   Oriented to person, place, problem, and time.    Memory: Good immediate recall.  Intact remote memory  Normal attention span and concentration.  Language: intact naming, repeating and fluency   Good fund of Knowledge. Aware of

## 2024-06-11 NOTE — PATIENT INSTRUCTIONS

## 2024-06-13 ENCOUNTER — OFFICE VISIT (OUTPATIENT)
Dept: DERMATOLOGY | Age: 49
End: 2024-06-13
Payer: COMMERCIAL

## 2024-06-13 DIAGNOSIS — D22.9 MULTIPLE NEVI: ICD-10-CM

## 2024-06-13 DIAGNOSIS — Z85.828 HISTORY OF BASAL CELL CARCINOMA: ICD-10-CM

## 2024-06-13 DIAGNOSIS — L81.4 SOLAR LENTIGINOSIS: ICD-10-CM

## 2024-06-13 DIAGNOSIS — L57.0 AK (ACTINIC KERATOSIS): Primary | ICD-10-CM

## 2024-06-13 PROCEDURE — 99213 OFFICE O/P EST LOW 20 MIN: CPT | Performed by: DERMATOLOGY

## 2024-06-13 NOTE — PROGRESS NOTES
affected areas of the skin twice daily for up to 2 weeks or until improved. 454 g 0    omeprazole (PRILOSEC) 40 MG delayed release capsule Take 1 capsule by mouth every morning (before breakfast) 30 capsule 3    carvedilol (COREG) 12.5 MG tablet Take 1 tablet by mouth 2 times daily 60 tablet 2    fluorouracil (EFUDEX) 5 % cream Apply topically 2 times daily for 14 days. 40 g 0         Physical Examination     Full skin exam except for the skin below the underwear.     Well appearing.    1.  Crown of the scalp > mid vertex scalp and temples with few scaly pink macules.      2.  Scalp, forearms with multiple small round smooth light brown macules and patches.     3.  Trunk > extremities with multiple small round to oval smooth brown macules.     4.  Clear.          Assessment and Plan     1. AK (actinic keratosis) - several lesions on the scalp and temples    Efudex cream twice daily to the scalp and temples for 14 days. We discussed the likely side effects of treatment including redness, crusting, itching and burning.       2. Solar lentiginosis     Monitor for change.  Sun protective behaviors encouraged including use of at least SPF 30 or more sunscreen.      3. Multiple nevi - benign appearing    Sun protective behaviors, including use of at least SPF 30 sunscreen, and self skin examinations were encouraged.  Call for any new or concerning lesions.       4. History of basal cell carcinomas - clear    Sun protective behaviors, including use of at least SPF 30 sunscreen, and self skin examinations were encouraged.  Call for any new or concerning lesions.            Return in about 6 months (around 12/13/2024).    --Sanju Mayers MD

## 2024-06-25 ENCOUNTER — HOSPITAL ENCOUNTER (OUTPATIENT)
Dept: MRI IMAGING | Age: 49
Discharge: HOME | End: 2024-06-25
Attending: PSYCHIATRY & NEUROLOGY
Payer: COMMERCIAL

## 2024-06-25 DIAGNOSIS — G40.209 PARTIAL EPILEPSY WITH IMPAIRMENT OF CONSCIOUSNESS (HCC): ICD-10-CM

## 2024-06-25 PROCEDURE — 70551 MRI BRAIN STEM W/O DYE: CPT

## 2024-12-18 ENCOUNTER — OFFICE VISIT (OUTPATIENT)
Age: 49
End: 2024-12-18
Payer: COMMERCIAL

## 2024-12-18 DIAGNOSIS — Z85.828 HISTORY OF BASAL CELL CARCINOMA: ICD-10-CM

## 2024-12-18 DIAGNOSIS — L81.4 SOLAR LENTIGINOSIS: ICD-10-CM

## 2024-12-18 DIAGNOSIS — D22.9 MULTIPLE NEVI: ICD-10-CM

## 2024-12-18 DIAGNOSIS — L57.0 AK (ACTINIC KERATOSIS): Primary | ICD-10-CM

## 2024-12-18 PROCEDURE — 99213 OFFICE O/P EST LOW 20 MIN: CPT | Performed by: DERMATOLOGY

## 2024-12-18 NOTE — PROGRESS NOTES
[facility]  Sanju Mayers MD  [phone]      Femi Anderson  1975    49 y.o. male     Date of Visit: 12/18/2024    Chief Complaint: skin lesions    History of Present Illness:    1.  He returns today to follow-up for history of multiple actinic keratoses on the crown greater than the mid vertex scalp along with the temples.  He used Efudex cream twice daily for about 12 days with improvement.    2.  He has stable freckling on the extremities.     3.  He also presents today for evaluation of multiple moles - not aware of any changes in size, color or shape.       4.  He has a history of BCCs - denies any signs of recurrence.     Superficial nodular BCC on the occipital scalp-treated with Mohs by Dr. Elliott on 8/1/2023.  Nodular basal cell carcinoma on the left cheek-treated with Mohs by Dr. Elliott on 7/9/2020.     Father with history of melanoma.    He has cirrhosis of the liver secondary to nonalcoholic fatty liver disease.      Review of Systems:  Gen: Feels well, good sense of health.    Past Medical History, Family History, Surgical History, Medications and Allergies reviewed.    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 seizures, without mention of intractable epilepsy 1988    last seizure was in 1992    TAYLOR (nonalcoholic steatohepatitis)     Neutropenia (HCC)      Past Surgical History:   Procedure Laterality Date    COLONOSCOPY N/A 3/6/2024    COLONOSCOPY POLYPECTOMY SNARE/COLD BIOPSY performed by Jacky Luther MD at [facility] ENDOSCOPY    MOHS SURGERY  07/09/2020    UPPER GASTROINTESTINAL ENDOSCOPY N/A 3/6/2024    ESOPHAGOGASTRODUODENOSCOPY BIOPSY performed by Jacky Luther MD at [facility] ENDOSCOPY       No Known Allergies  Outpatient Medications Marked as Taking for the 12/18/24 encounter (Office Visit) with Sanju Mayers MD   Medication Sig

## 2025-01-16 ENCOUNTER — OFFICE VISIT (OUTPATIENT)
Dept: NEUROLOGY | Age: 50
End: 2025-01-16
Payer: COMMERCIAL

## 2025-01-16 VITALS — SYSTOLIC BLOOD PRESSURE: 184 MMHG | HEART RATE: 58 BPM | DIASTOLIC BLOOD PRESSURE: 97 MMHG

## 2025-01-16 DIAGNOSIS — G40.209 PARTIAL EPILEPSY WITH IMPAIRMENT OF CONSCIOUSNESS (HCC): Primary | ICD-10-CM

## 2025-01-16 DIAGNOSIS — D61.818 PANCYTOPENIA (HCC): ICD-10-CM

## 2025-01-16 DIAGNOSIS — K74.60 CIRRHOSIS OF LIVER WITHOUT ASCITES, UNSPECIFIED HEPATIC CIRRHOSIS TYPE (HCC): ICD-10-CM

## 2025-01-16 PROCEDURE — 99213 OFFICE O/P EST LOW 20 MIN: CPT | Performed by: PSYCHIATRY & NEUROLOGY

## 2025-01-16 NOTE — PROGRESS NOTES
and symmetric  XII: Tongue movements are normal  Musculoskeletal: 5/5 in all 4 extremities.   Sensation: normal   Gait/Posture: steady gait     ROS : A 10-14 system review of constitutional, cardiovascular, respiratory, GI, eyes, , ENT, musculoskeletal, endocrine, hematological, skin, SHEENT, genitourinary, psychiatric and neurologic systems was obtained and updated today which is unremarkable except as mentioned in my HPI      Medical decision making:    A. Problems (any 1)    High:    [] Acute/Chronic Illness/injury posing threat to life or bodily function:    [] Severe exacerbation of chronic illness:      Moderate:    []     1 or more chronic illness with exacerbation, progression or side effect of treatment or  []     2 or more stable chronic illnesses or  []     1 acute illness with systemic symptoms       Mild:  [x]     1 stable chronic illness     ---------------------------------------------------------------------  B. Risk of Treatment (any 1)   [x] Drugs/treatments that require intensive monitoring for toxicity include:    Pancytopenia  [] Prescription drug management  ----------------------------------------------------------------------  [] High (any 2)  [x] Moderate (any 1)    C. Data (any 2 for high and any 1 for moderate)  [] Discussed management of the case with:    [] Imaging personally reviewed and interpreted, includes:    [x] Data Review (any 3)  [] Collateral history obtained from:    [x] All available Consultant notes were reviewed  [x] All current labs were reviewed and interpreted for clinical significance   [x] Appropriate follow-up test and labs were ordered    I personally reviewed and updated social history, past medical history, medications, allergy, surgical history, and family history as documented in the patient's electronic health records.   Provided patient education regarding risk, benefits and treatment options as well as adherence to medication regimen and side effect from

## 2025-01-16 NOTE — PATIENT INSTRUCTIONS

## 2025-02-19 RX ORDER — TRIAMCINOLONE ACETONIDE 1 MG/G
CREAM TOPICAL
Qty: 454 G | Refills: 0 | Status: SHIPPED | OUTPATIENT
Start: 2025-02-19

## 2025-06-16 ENCOUNTER — OFFICE VISIT (OUTPATIENT)
Age: 50
End: 2025-06-16
Payer: COMMERCIAL

## 2025-06-16 DIAGNOSIS — Z85.828 HISTORY OF BASAL CELL CARCINOMA: ICD-10-CM

## 2025-06-16 DIAGNOSIS — L83 ACANTHOSIS NIGRICANS: ICD-10-CM

## 2025-06-16 DIAGNOSIS — L57.0 AK (ACTINIC KERATOSIS): Primary | ICD-10-CM

## 2025-06-16 DIAGNOSIS — D23.71 DERMATOFIBROMA OF RIGHT LOWER LEG: ICD-10-CM

## 2025-06-16 DIAGNOSIS — D22.9 MULTIPLE NEVI: ICD-10-CM

## 2025-06-16 PROCEDURE — 99213 OFFICE O/P EST LOW 20 MIN: CPT | Performed by: DERMATOLOGY

## 2025-06-16 RX ORDER — TRIAMCINOLONE ACETONIDE 1 MG/G
CREAM TOPICAL
Qty: 454 G | Refills: 0 | Status: SHIPPED | OUTPATIENT
Start: 2025-06-16

## 2025-06-16 NOTE — PROGRESS NOTES
[facility]  Sanju Mayers MD  [phone]      Femi Anderson  1975    50 y.o. male     Date of Visit: 6/16/2025    Chief Complaint: follow up for actinic keratoses    History of Present Illness:    1.  He returns for a history of AKs on the frontal scalp and crown of the scalp.  He used Efudex cream since last visit - stopped after 5 to 6 days due to irritation.      2.  He also presents today for evaluation of multiple moles - not aware of any changes in size, color or shape.       3.  He reports a stable lesion on the right calf.     4.  Has hx of BCCs:     Superficial nodular BCC on the occipital scalp-treated with Mohs by Dr. Elliott on 8/1/2023.  Nodular basal cell carcinoma on the left cheek-treated with Mohs by Dr. Elliott on 7/9/2020.     Father with history of melanoma.     He has cirrhosis of the liver secondary to nonalcoholic fatty liver disease.      Review of Systems:  Gen: Feels well, good sense of health.    Past Medical History, Family History, Surgical History, Medications and Allergies reviewed.    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 seizures, without mention of intractable epilepsy 1988    last seizure was in 1992    TAYLOR (nonalcoholic steatohepatitis)     Neutropenia      Past Surgical History:   Procedure Laterality Date    COLONOSCOPY N/A 3/6/2024    COLONOSCOPY POLYPECTOMY SNARE/COLD BIOPSY performed by Jacky Luther MD at [facility] ENDOSCOPY    MOHS SURGERY  07/09/2020    UPPER GASTROINTESTINAL ENDOSCOPY N/A 3/6/2024    ESOPHAGOGASTRODUODENOSCOPY BIOPSY performed by Jacky Luther MD at [facility] ENDOSCOPY       No Known Allergies  Outpatient Medications Marked as Taking for the 6/16/25 encounter (Office Visit) with Sanju Mayers MD   Medication Sig Dispense Refill    triamcinolone (KENALOG) 0.1 % cream Apply to affected areas of

## (undated) DEVICE — SINGLE USE AIR/WATER, SUCTION AND BIOPSY VALVES SET: Brand: ORCAPOD™

## (undated) DEVICE — Device: Brand: MEDEX

## (undated) DEVICE — CYSTO PACK: Brand: MEDLINE INDUSTRIES, INC.

## (undated) DEVICE — GLOVE ORANGE PI 7   MSG9070

## (undated) DEVICE — SNARE COLD DIAMOND 15MM THIN

## (undated) DEVICE — TRAP SPEC RETRV CLR PLAS POLYP IN LN SUCT QUIK CTCH

## (undated) DEVICE — BW-412T DISP COMBO CLEANING BRUSH: Brand: SINGLE USE COMBINATION CLEANING BRUSH

## (undated) DEVICE — GOWN AURORA NONREINF LG: Brand: MEDLINE INDUSTRIES, INC.

## (undated) DEVICE — WET SKIN PREP TRAY: Brand: MEDLINE INDUSTRIES, INC.

## (undated) DEVICE — SOLUTION IV IRRIG WATER 500ML POUR BRL ST 2F7113

## (undated) DEVICE — SYRINGE MED 10ML SLIP TIP BLNT FILL AND LUERLOCK DISP

## (undated) DEVICE — SOLUTION IRRIG 1000ML STRL H2O USP PLAS POUR BTL

## (undated) DEVICE — ENDOSCOPIC KIT 6X3/16 FT COLON W/ 1.1 OZ 2 GWN W/O BRSH

## (undated) DEVICE — POSITIONER HD W8XH4XL8.5IN RASPBERRY FOAM SLT

## (undated) DEVICE — 1LYRTR 16FR10ML 100%SILI SNAP: Brand: MEDLINE INDUSTRIES, INC.

## (undated) DEVICE — BAG DRAINAGE NS

## (undated) DEVICE — GUIDEWIRE ENDOSCP L150CM DIA0.035IN TIP 3CM PTFE NIT

## (undated) DEVICE — MOUTHPIECE ENDOSCP L CTRL OPN AND SIDE PORTS DISP

## (undated) DEVICE — FORCEPS BX L240CM WRK CHN 2.8MM STD CAP W/ NDL MIC MESH

## (undated) DEVICE — SOLUTION IRRIGATION STRL H2O 1000 ML UROMATIC CONTAINER

## (undated) DEVICE — AIR/WATER CLEANING ADAPTER FOR OLYMPUS® GI ENDOSCOPE: Brand: BULLDOG®